# Patient Record
Sex: FEMALE | Race: WHITE | Employment: PART TIME | URBAN - METROPOLITAN AREA
[De-identification: names, ages, dates, MRNs, and addresses within clinical notes are randomized per-mention and may not be internally consistent; named-entity substitution may affect disease eponyms.]

---

## 2017-04-26 ENCOUNTER — OFFICE VISIT (OUTPATIENT)
Dept: FAMILY MEDICINE CLINIC | Age: 52
End: 2017-04-26

## 2017-04-26 VITALS
WEIGHT: 154.8 LBS | BODY MASS INDEX: 27.43 KG/M2 | HEART RATE: 70 BPM | DIASTOLIC BLOOD PRESSURE: 84 MMHG | SYSTOLIC BLOOD PRESSURE: 136 MMHG | HEIGHT: 63 IN | RESPIRATION RATE: 16 BRPM | OXYGEN SATURATION: 98 %

## 2017-04-26 DIAGNOSIS — Z12.39 SCREENING FOR BREAST CANCER: ICD-10-CM

## 2017-04-26 DIAGNOSIS — F98.8 ADD (ATTENTION DEFICIT DISORDER): ICD-10-CM

## 2017-04-26 DIAGNOSIS — S93.431A SPRAIN OF TIBIOFIBULAR LIGAMENT OF RIGHT ANKLE, INITIAL ENCOUNTER: ICD-10-CM

## 2017-04-26 DIAGNOSIS — Z23 NEED FOR TDAP VACCINATION: ICD-10-CM

## 2017-04-26 DIAGNOSIS — Z12.11 SCREENING FOR COLON CANCER: Primary | ICD-10-CM

## 2017-04-26 PROCEDURE — 99213 OFFICE O/P EST LOW 20 MIN: CPT | Performed by: FAMILY MEDICINE

## 2017-04-26 ASSESSMENT — PATIENT HEALTH QUESTIONNAIRE - PHQ9
1. LITTLE INTEREST OR PLEASURE IN DOING THINGS: 0
SUM OF ALL RESPONSES TO PHQ QUESTIONS 1-9: 0
2. FEELING DOWN, DEPRESSED OR HOPELESS: 0
SUM OF ALL RESPONSES TO PHQ9 QUESTIONS 1 & 2: 0

## 2017-05-23 DIAGNOSIS — F98.8 ADD (ATTENTION DEFICIT DISORDER): ICD-10-CM

## 2017-06-26 DIAGNOSIS — F98.8 ADD (ATTENTION DEFICIT DISORDER): ICD-10-CM

## 2017-07-24 DIAGNOSIS — F98.8 ADD (ATTENTION DEFICIT DISORDER): ICD-10-CM

## 2017-08-22 DIAGNOSIS — F98.8 ADD (ATTENTION DEFICIT DISORDER): ICD-10-CM

## 2017-09-26 DIAGNOSIS — F98.8 ADD (ATTENTION DEFICIT DISORDER): ICD-10-CM

## 2017-10-24 DIAGNOSIS — F98.8 ATTENTION DEFICIT DISORDER, UNSPECIFIED HYPERACTIVITY PRESENCE: ICD-10-CM

## 2017-10-24 NOTE — TELEPHONE ENCOUNTER
Medication and Quantity requested: lisdexamfetamine (VYVANSE) 30 MG capsule/ 30        Last Visit  04/26/2017    Pharmacy and phone number updated in EPIC:  yes

## 2017-10-28 DIAGNOSIS — F98.8 ATTENTION DEFICIT DISORDER, UNSPECIFIED HYPERACTIVITY PRESENCE: ICD-10-CM

## 2017-11-21 DIAGNOSIS — F98.8 ATTENTION DEFICIT DISORDER, UNSPECIFIED HYPERACTIVITY PRESENCE: ICD-10-CM

## 2017-11-22 NOTE — TELEPHONE ENCOUNTER
I contacted the office to see if she had received this message. I left a message asking her to call the office.

## 2017-11-27 DIAGNOSIS — F98.8 ATTENTION DEFICIT DISORDER, UNSPECIFIED HYPERACTIVITY PRESENCE: ICD-10-CM

## 2017-11-27 NOTE — TELEPHONE ENCOUNTER
Medication and Quantity requested: lisdexamfetamine (VYVANSE) 30 MG capsule     Last Visit  4/26/17    Pharmacy and phone number updated in EPIC:  yes

## 2017-11-29 ENCOUNTER — OFFICE VISIT (OUTPATIENT)
Dept: FAMILY MEDICINE CLINIC | Age: 52
End: 2017-11-29

## 2017-11-29 VITALS
OXYGEN SATURATION: 98 % | RESPIRATION RATE: 15 BRPM | TEMPERATURE: 97.5 F | WEIGHT: 147 LBS | BODY MASS INDEX: 26.04 KG/M2 | DIASTOLIC BLOOD PRESSURE: 78 MMHG | SYSTOLIC BLOOD PRESSURE: 112 MMHG | HEART RATE: 70 BPM

## 2017-11-29 DIAGNOSIS — J06.9 UPPER RESPIRATORY TRACT INFECTION, UNSPECIFIED TYPE: Primary | ICD-10-CM

## 2017-11-29 PROCEDURE — 99214 OFFICE O/P EST MOD 30 MIN: CPT | Performed by: FAMILY MEDICINE

## 2017-11-29 RX ORDER — AZITHROMYCIN 250 MG/1
TABLET, FILM COATED ORAL
Qty: 6 TABLET | Refills: 0 | Status: SHIPPED | OUTPATIENT
Start: 2017-11-29 | End: 2018-07-31

## 2017-11-29 NOTE — LETTER
[address] [address]  Phone: [phone]  Fax: [phone]    Jim Willis MD          November 29, 2017           Patient: Amira Velez   MR Number: [id number]   YOB: 1965   Date of Visit: 11/29/2017       Please excuse the above patient from work from November 27-29 due to illness/doctor's appointment. Please call my office if you have any questions.     Sincerely,                           Jim Willis MD

## 2017-11-29 NOTE — PATIENT INSTRUCTIONS
acetaminophen (Tylenol) can be harmful. · Get plenty of rest.  · Do not smoke or allow others to smoke around you. If you need help quitting, talk to your doctor about stop-smoking programs and medicines. These can increase your chances of quitting for good. When should you call for help? Call 911 anytime you think you may need emergency care. For example, call if:  · You have severe trouble breathing. Call your doctor now or seek immediate medical care if:  · You seem to be getting much sicker. · You have new or worse trouble breathing. · You have a new or higher fever. · You have a new rash. Watch closely for changes in your health, and be sure to contact your doctor if:  · You have a new symptom, such as a sore throat, an earache, or sinus pain. · You cough more deeply or more often, especially if you notice more mucus or a change in the color of your mucus. · You do not get better as expected. Where can you learn more? Go to https://Nortis.VEASYT. org and sign in to your Echometrix account. Enter B411 in the Fluorofinder box to learn more about \"Upper Respiratory Infection (Cold): Care Instructions. \"     If you do not have an account, please click on the \"Sign Up Now\" link. Current as of: March 25, 2017  Content Version: 11.3  © 4064-5468 Shareight, Incorporated. Care instructions adapted under license by [facility] ([facility]). If you have questions about a medical condition or this instruction, always ask your healthcare professional. [place] [address] any warranty or liability for your use of this information.

## 2017-11-30 ENCOUNTER — TELEPHONE (OUTPATIENT)
Dept: FAMILY MEDICINE CLINIC | Age: 52
End: 2017-11-30

## 2017-12-26 DIAGNOSIS — F98.8 ATTENTION DEFICIT DISORDER, UNSPECIFIED HYPERACTIVITY PRESENCE: ICD-10-CM

## 2017-12-26 NOTE — TELEPHONE ENCOUNTER
Medication and Quantity requested: lisdexamfetamine (VYVANSE) 30 MG capsule  Qty 30     Last Visit  11/29/17    Pharmacy and phone number updated in [facility]:  yes

## 2018-01-25 DIAGNOSIS — F98.8 ATTENTION DEFICIT DISORDER, UNSPECIFIED HYPERACTIVITY PRESENCE: ICD-10-CM

## 2018-01-25 NOTE — TELEPHONE ENCOUNTER
Medication and Quantity requested: lisdexamfetamine (VYVANSE) 30 MG capsule        Last Visit  11/29/2017    Pharmacy and phone number updated in EPIC:  yes

## 2018-02-07 ENCOUNTER — OFFICE VISIT (OUTPATIENT)
Dept: FAMILY MEDICINE CLINIC | Age: 53
End: 2018-02-07

## 2018-02-07 VITALS
DIASTOLIC BLOOD PRESSURE: 64 MMHG | SYSTOLIC BLOOD PRESSURE: 110 MMHG | HEIGHT: 63 IN | BODY MASS INDEX: 26.05 KG/M2 | WEIGHT: 147 LBS

## 2018-02-07 DIAGNOSIS — F51.02 ADJUSTMENT INSOMNIA: ICD-10-CM

## 2018-02-07 DIAGNOSIS — L98.9 SKIN ABNORMALITY: ICD-10-CM

## 2018-02-07 DIAGNOSIS — F43.21 GRIEF REACTION: Primary | ICD-10-CM

## 2018-02-07 PROCEDURE — G8419 CALC BMI OUT NRM PARAM NOF/U: HCPCS | Performed by: FAMILY MEDICINE

## 2018-02-07 PROCEDURE — 1036F TOBACCO NON-USER: CPT | Performed by: FAMILY MEDICINE

## 2018-02-07 PROCEDURE — 3014F SCREEN MAMMO DOC REV: CPT | Performed by: FAMILY MEDICINE

## 2018-02-07 PROCEDURE — G8484 FLU IMMUNIZE NO ADMIN: HCPCS | Performed by: FAMILY MEDICINE

## 2018-02-07 PROCEDURE — 99213 OFFICE O/P EST LOW 20 MIN: CPT | Performed by: FAMILY MEDICINE

## 2018-02-07 PROCEDURE — 3017F COLORECTAL CA SCREEN DOC REV: CPT | Performed by: FAMILY MEDICINE

## 2018-02-07 PROCEDURE — G8427 DOCREV CUR MEDS BY ELIG CLIN: HCPCS | Performed by: FAMILY MEDICINE

## 2018-02-07 RX ORDER — LORAZEPAM 0.5 MG/1
0.5 TABLET ORAL NIGHTLY
Qty: 15 TABLET | Refills: 0 | Status: SHIPPED | OUTPATIENT
Start: 2018-02-07 | End: 2018-02-22

## 2018-02-07 NOTE — PROGRESS NOTES
Feliz Edmond is a 46 y.o. female. HPI:  Father  2 days ago, she is sad and tearful  Having a hard time going to work- started a new job  Having hard time sleeping  Also concerned about area on the left side of her nose near the tip which is enlarging  Needs  to get her mammogram    Wt Readings from Last 3 Encounters:   18 147 lb (66.7 kg)   17 147 lb (66.7 kg)   17 154 lb 12.8 oz (70.2 kg)     Meds, vitamins and allergies reviewed with Patient    ROS:  Gen:  No fever  HEENT:  No cold symptoms, sore throat. CV:  Denies chest pain or palpitations. Pulm:  Denies shortness of breath, cough. Abd:  Denies abdominal pain, nausea and vomiting. Skin: no rash    Allergies   Allergen Reactions    Pcn [Penicillins] Other (See Comments)       Prior to Visit Medications    Medication Sig Taking? Authorizing Provider   LORazepam (ATIVAN) 0.5 MG tablet Take 1 tablet by mouth nightly for 15 days. Yes Meera Fleming MD   lisdexamfetamine (VYVANSE) 30 MG capsule Take 1 capsule by mouth every morning for 30 days. Yes Jayjay Balderas MD   azithromycin (ZITHROMAX Z-MARTHA) 250 MG tablet Take 2 tablets on day one and 1 tablet daily on days 2-5  Craig Glass MD       OBJECTIVE:  /64 (Site: Right Arm, Position: Sitting, Cuff Size: Small Adult)   Ht 5' 3\" (1.6 m)   Wt 147 lb (66.7 kg)   LMP 2016 (Approximate)   BMI 26.04 kg/m²   GEN:  Tearful  HEENT:  NCAT, TMs:normal and throat: clear   Left side of her nose is slightly swollen  NECK:  Supple without adenopathy. CV:  Regular rate and rhythm, S1 and S2 normal  PULM:  Chest is clear, no wheezing ,  symmetric air entry throughout both lung fields. ABD: Soft, NT  EXT: No rash or edema  NEURO: Alert and oriented ×3 nonfocal    ASSESSMENT/PLAN:  1. Skin abnormality  Refer  - Toshia Gonzalez MD    2. Grief reaction  Needs to take time off of work- rest of this week return Monday    3.  Adjustment insomnia  Ativan when necessary only  - LORazepam

## 2018-02-26 ENCOUNTER — TELEPHONE (OUTPATIENT)
Dept: FAMILY MEDICINE CLINIC | Age: 53
End: 2018-02-26

## 2018-02-26 ENCOUNTER — OFFICE VISIT (OUTPATIENT)
Dept: FAMILY MEDICINE CLINIC | Age: 53
End: 2018-02-26

## 2018-02-26 VITALS
OXYGEN SATURATION: 96 % | HEIGHT: 63 IN | DIASTOLIC BLOOD PRESSURE: 78 MMHG | BODY MASS INDEX: 27.29 KG/M2 | WEIGHT: 154 LBS | HEART RATE: 90 BPM | SYSTOLIC BLOOD PRESSURE: 130 MMHG

## 2018-02-26 DIAGNOSIS — F98.8 ATTENTION DEFICIT DISORDER, UNSPECIFIED HYPERACTIVITY PRESENCE: ICD-10-CM

## 2018-02-26 DIAGNOSIS — F98.8 ATTENTION DEFICIT DISORDER, UNSPECIFIED HYPERACTIVITY PRESENCE: Primary | ICD-10-CM

## 2018-02-26 DIAGNOSIS — J30.81 CAT ALLERGIES: ICD-10-CM

## 2018-02-26 DIAGNOSIS — Z76.0 ENCOUNTER FOR MEDICATION REFILL: ICD-10-CM

## 2018-02-26 DIAGNOSIS — F43.21 GRIEF REACTION: ICD-10-CM

## 2018-02-26 PROCEDURE — G8427 DOCREV CUR MEDS BY ELIG CLIN: HCPCS | Performed by: FAMILY MEDICINE

## 2018-02-26 PROCEDURE — G8419 CALC BMI OUT NRM PARAM NOF/U: HCPCS | Performed by: FAMILY MEDICINE

## 2018-02-26 PROCEDURE — 99213 OFFICE O/P EST LOW 20 MIN: CPT | Performed by: FAMILY MEDICINE

## 2018-02-26 PROCEDURE — 1036F TOBACCO NON-USER: CPT | Performed by: FAMILY MEDICINE

## 2018-02-26 PROCEDURE — 3014F SCREEN MAMMO DOC REV: CPT | Performed by: FAMILY MEDICINE

## 2018-02-26 PROCEDURE — 3017F COLORECTAL CA SCREEN DOC REV: CPT | Performed by: FAMILY MEDICINE

## 2018-02-26 PROCEDURE — G8484 FLU IMMUNIZE NO ADMIN: HCPCS | Performed by: FAMILY MEDICINE

## 2018-02-26 RX ORDER — LORAZEPAM 1 MG/1
1 TABLET ORAL NIGHTLY PRN
Qty: 30 TABLET | Refills: 0 | Status: SHIPPED | OUTPATIENT
Start: 2018-02-26 | End: 2018-03-28

## 2018-02-26 RX ORDER — LORATADINE 10 MG/1
10 TABLET ORAL DAILY
Qty: 90 TABLET | Refills: 1 | Status: SHIPPED | OUTPATIENT
Start: 2018-02-26 | End: 2019-06-12

## 2018-02-26 RX ORDER — FLUTICASONE PROPIONATE 50 MCG
2 SPRAY, SUSPENSION (ML) NASAL DAILY
Qty: 1 BOTTLE | Refills: 3 | Status: SHIPPED | OUTPATIENT
Start: 2018-02-26 | End: 2018-07-31 | Stop reason: SDUPTHER

## 2018-03-24 DIAGNOSIS — F98.8 ATTENTION DEFICIT DISORDER, UNSPECIFIED HYPERACTIVITY PRESENCE: ICD-10-CM

## 2018-03-24 NOTE — TELEPHONE ENCOUNTER
Medication and Quantity requested:  lisdexamfetamine   (vyvanse) qty 30    Last Visit 02/26/2018      Pharmacy and phone number updated in EPIC:  yes

## 2018-03-27 DIAGNOSIS — F98.8 ATTENTION DEFICIT DISORDER, UNSPECIFIED HYPERACTIVITY PRESENCE: ICD-10-CM

## 2018-04-25 DIAGNOSIS — F98.8 ATTENTION DEFICIT DISORDER, UNSPECIFIED HYPERACTIVITY PRESENCE: ICD-10-CM

## 2018-05-24 DIAGNOSIS — F98.8 ATTENTION DEFICIT DISORDER, UNSPECIFIED HYPERACTIVITY PRESENCE: ICD-10-CM

## 2018-06-25 DIAGNOSIS — F98.8 ATTENTION DEFICIT DISORDER, UNSPECIFIED HYPERACTIVITY PRESENCE: ICD-10-CM

## 2018-07-24 DIAGNOSIS — F98.8 ATTENTION DEFICIT DISORDER, UNSPECIFIED HYPERACTIVITY PRESENCE: ICD-10-CM

## 2018-07-31 ENCOUNTER — OFFICE VISIT (OUTPATIENT)
Dept: FAMILY MEDICINE CLINIC | Age: 53
End: 2018-07-31

## 2018-07-31 VITALS
HEART RATE: 90 BPM | WEIGHT: 143.6 LBS | HEIGHT: 63 IN | DIASTOLIC BLOOD PRESSURE: 78 MMHG | SYSTOLIC BLOOD PRESSURE: 110 MMHG | BODY MASS INDEX: 25.45 KG/M2 | TEMPERATURE: 98.6 F | OXYGEN SATURATION: 98 %

## 2018-07-31 DIAGNOSIS — M25.522 ELBOW PAIN, LEFT: ICD-10-CM

## 2018-07-31 DIAGNOSIS — R05.9 COUGH: Primary | ICD-10-CM

## 2018-07-31 DIAGNOSIS — M25.512 ACUTE PAIN OF LEFT SHOULDER: ICD-10-CM

## 2018-07-31 DIAGNOSIS — J30.81 CAT ALLERGIES: ICD-10-CM

## 2018-07-31 PROCEDURE — 1036F TOBACCO NON-USER: CPT | Performed by: FAMILY MEDICINE

## 2018-07-31 PROCEDURE — 99213 OFFICE O/P EST LOW 20 MIN: CPT | Performed by: FAMILY MEDICINE

## 2018-07-31 PROCEDURE — G8419 CALC BMI OUT NRM PARAM NOF/U: HCPCS | Performed by: FAMILY MEDICINE

## 2018-07-31 PROCEDURE — G8427 DOCREV CUR MEDS BY ELIG CLIN: HCPCS | Performed by: FAMILY MEDICINE

## 2018-07-31 PROCEDURE — 3017F COLORECTAL CA SCREEN DOC REV: CPT | Performed by: FAMILY MEDICINE

## 2018-07-31 RX ORDER — METHYLPREDNISOLONE 4 MG/1
TABLET ORAL
Qty: 1 KIT | Refills: 0 | Status: SHIPPED | OUTPATIENT
Start: 2018-07-31 | End: 2018-08-07 | Stop reason: SDUPTHER

## 2018-07-31 RX ORDER — FLUTICASONE PROPIONATE 50 MCG
2 SPRAY, SUSPENSION (ML) NASAL DAILY
Qty: 1 BOTTLE | Refills: 3 | Status: SHIPPED | OUTPATIENT
Start: 2018-07-31 | End: 2020-12-16

## 2018-07-31 ASSESSMENT — ENCOUNTER SYMPTOMS
GASTROINTESTINAL NEGATIVE: 1
COUGH: 1
WHEEZING: 0

## 2018-07-31 ASSESSMENT — PATIENT HEALTH QUESTIONNAIRE - PHQ9
2. FEELING DOWN, DEPRESSED OR HOPELESS: 0
1. LITTLE INTEREST OR PLEASURE IN DOING THINGS: 0
SUM OF ALL RESPONSES TO PHQ9 QUESTIONS 1 & 2: 0
SUM OF ALL RESPONSES TO PHQ QUESTIONS 1-9: 0

## 2018-07-31 NOTE — LETTER
[address] [address]  Phone: [phone]  Fax: [phone]    Abraham Mcmanus MD        July 31, 2018     Patient: Rosalba Charles   YOB: 1965   Date of Visit: 7/31/2018       To Whom it May Concern:    Rosalba Charles was seen in my clinic on 7/31/2018. She may return to work on 08/01/18      If you have any questions or concerns, please don't hesitate to call.     Sincerely,         Abraham Mcmanus MD

## 2018-07-31 NOTE — PATIENT INSTRUCTIONS
Patient Education        Cough: Care Instructions  Your Care Instructions    A cough is your body's response to something that bothers your throat or airways. Many things can cause a cough. You might cough because of a cold or the flu, bronchitis, or asthma. Smoking, postnasal drip, allergies, and stomach acid that backs up into your throat also can cause coughs. A cough is a symptom, not a disease. Most coughs stop when the cause, such as a cold, goes away. You can take a few steps at home to cough less and feel better. Follow-up care is a key part of your treatment and safety. Be sure to make and go to all appointments, and call your doctor if you are having problems. It's also a good idea to know your test results and keep a list of the medicines you take. How can you care for yourself at home? · Drink lots of water and other fluids. This helps thin the mucus and soothes a dry or sore throat. Honey or lemon juice in hot water or tea may ease a dry cough. · Take cough medicine as directed by your doctor. · Prop up your head on pillows to help you breathe and ease a dry cough. · Try cough drops to soothe a dry or sore throat. Cough drops don't stop a cough. Medicine-flavored cough drops are no better than candy-flavored drops or hard candy. · Do not smoke. Avoid secondhand smoke. If you need help quitting, talk to your doctor about stop-smoking programs and medicines. These can increase your chances of quitting for good. When should you call for help? Call 911 anytime you think you may need emergency care.  For example, call if:    · You have severe trouble breathing.    Call your doctor now or seek immediate medical care if:    · You cough up blood.     · You have new or worse trouble breathing.     · You have a new or higher fever.     · You have a new rash.    Watch closely for changes in your health, and be sure to contact your doctor if:    · You cough more deeply or more often, especially if you notice more mucus or a change in the color of your mucus.     · You have new symptoms, such as a sore throat, an earache, or sinus pain.     · You do not get better as expected. Where can you learn more? Go to https://chpeoanheweb.Offbeat Guides. org and sign in to your Hers account. Enter D279 in the Helixbind box to learn more about \"Cough: Care Instructions. \"     If you do not have an account, please click on the \"Sign Up Now\" link. Current as of: December 6, 2017  Content Version: 11.6  © 2774-4830 Tradeos, Incorporated. Care instructions adapted under license by [facility] ([facility]). If you have questions about a medical condition or this instruction, always ask your healthcare professional. [address] any warranty or liability for your use of this information.

## 2018-08-01 ENCOUNTER — HOSPITAL ENCOUNTER (OUTPATIENT)
Dept: OTHER | Age: 53
Discharge: HOME | End: 2018-08-01
Attending: FAMILY MEDICINE | Admitting: FAMILY MEDICINE

## 2018-08-01 ENCOUNTER — TELEPHONE (OUTPATIENT)
Dept: FAMILY MEDICINE CLINIC | Age: 53
End: 2018-08-01

## 2018-08-01 DIAGNOSIS — R05.9 COUGH: ICD-10-CM

## 2018-08-02 NOTE — TELEPHONE ENCOUNTER
Not sure if the original letter was faxed but I printed a new one because the dates were wrong. Letter on counter for you to sign.

## 2018-08-02 NOTE — TELEPHONE ENCOUNTER
What is the status of the letter. Take care of it if not already done and fax it to her work. Let her know. Chest x-ray is in result notes or her my chart if it is active. Chest x-ray was negative. It may take a few more days to feel better.

## 2018-08-03 ENCOUNTER — TELEPHONE (OUTPATIENT)
Dept: FAMILY MEDICINE CLINIC | Age: 53
End: 2018-08-03

## 2018-08-03 NOTE — TELEPHONE ENCOUNTER
Pt states she is still not feeling well and is requesting a new letter be sent to her employer stating she can return to work Monday, August 6th. Pt states she is sick to her stomach, has diarrhea, headache, and cough.     Fax: [phone]

## 2018-08-03 NOTE — TELEPHONE ENCOUNTER
Pt would like to know if the first work letter has been already sent to her employer. Pt says that she's feel a bit better but will feel better is she returns back to work on Monday. Please create second work letter with return back to work on Monday and fax.

## 2018-08-07 ENCOUNTER — TELEPHONE (OUTPATIENT)
Dept: FAMILY MEDICINE CLINIC | Age: 53
End: 2018-08-07

## 2018-08-07 ENCOUNTER — OFFICE VISIT (OUTPATIENT)
Dept: FAMILY MEDICINE CLINIC | Age: 53
End: 2018-08-07

## 2018-08-07 VITALS
HEART RATE: 91 BPM | OXYGEN SATURATION: 99 % | BODY MASS INDEX: 26.29 KG/M2 | DIASTOLIC BLOOD PRESSURE: 80 MMHG | HEIGHT: 63 IN | WEIGHT: 148.4 LBS | TEMPERATURE: 97.7 F | SYSTOLIC BLOOD PRESSURE: 114 MMHG

## 2018-08-07 DIAGNOSIS — M25.522 ELBOW PAIN, LEFT: ICD-10-CM

## 2018-08-07 DIAGNOSIS — R05.9 COUGH: ICD-10-CM

## 2018-08-07 DIAGNOSIS — M25.512 ACUTE PAIN OF LEFT SHOULDER: ICD-10-CM

## 2018-08-07 DIAGNOSIS — Z00.00 WELL ADULT EXAM: ICD-10-CM

## 2018-08-07 DIAGNOSIS — R05.9 COUGH: Primary | ICD-10-CM

## 2018-08-07 PROCEDURE — G8427 DOCREV CUR MEDS BY ELIG CLIN: HCPCS | Performed by: FAMILY MEDICINE

## 2018-08-07 PROCEDURE — 1036F TOBACCO NON-USER: CPT | Performed by: FAMILY MEDICINE

## 2018-08-07 PROCEDURE — 3017F COLORECTAL CA SCREEN DOC REV: CPT | Performed by: FAMILY MEDICINE

## 2018-08-07 PROCEDURE — 99213 OFFICE O/P EST LOW 20 MIN: CPT | Performed by: FAMILY MEDICINE

## 2018-08-07 PROCEDURE — G8419 CALC BMI OUT NRM PARAM NOF/U: HCPCS | Performed by: FAMILY MEDICINE

## 2018-08-07 RX ORDER — AZITHROMYCIN 250 MG/1
TABLET, FILM COATED ORAL
Qty: 6 TABLET | Refills: 0 | Status: SHIPPED | OUTPATIENT
Start: 2018-08-07 | End: 2018-08-17

## 2018-08-07 ASSESSMENT — PATIENT HEALTH QUESTIONNAIRE - PHQ9
1. LITTLE INTEREST OR PLEASURE IN DOING THINGS: 0
SUM OF ALL RESPONSES TO PHQ QUESTIONS 1-9: 0
SUM OF ALL RESPONSES TO PHQ QUESTIONS 1-9: 0
SUM OF ALL RESPONSES TO PHQ9 QUESTIONS 1 & 2: 0
2. FEELING DOWN, DEPRESSED OR HOPELESS: 0

## 2018-08-07 ASSESSMENT — ENCOUNTER SYMPTOMS
SHORTNESS OF BREATH: 1
GASTROINTESTINAL NEGATIVE: 1
COUGH: 1

## 2018-08-07 NOTE — PATIENT INSTRUCTIONS
Patient Education        Cough: Care Instructions  Your Care Instructions    A cough is your body's response to something that bothers your throat or airways. Many things can cause a cough. You might cough because of a cold or the flu, bronchitis, or asthma. Smoking, postnasal drip, allergies, and stomach acid that backs up into your throat also can cause coughs. A cough is a symptom, not a disease. Most coughs stop when the cause, such as a cold, goes away. You can take a few steps at home to cough less and feel better. Follow-up care is a key part of your treatment and safety. Be sure to make and go to all appointments, and call your doctor if you are having problems. It's also a good idea to know your test results and keep a list of the medicines you take. How can you care for yourself at home? · Drink lots of water and other fluids. This helps thin the mucus and soothes a dry or sore throat. Honey or lemon juice in hot water or tea may ease a dry cough. · Take cough medicine as directed by your doctor. · Prop up your head on pillows to help you breathe and ease a dry cough. · Try cough drops to soothe a dry or sore throat. Cough drops don't stop a cough. Medicine-flavored cough drops are no better than candy-flavored drops or hard candy. · Do not smoke. Avoid secondhand smoke. If you need help quitting, talk to your doctor about stop-smoking programs and medicines. These can increase your chances of quitting for good. When should you call for help? Call 911 anytime you think you may need emergency care.  For example, call if:    · You have severe trouble breathing.    Call your doctor now or seek immediate medical care if:    · You cough up blood.     · You have new or worse trouble breathing.     · You have a new or higher fever.     · You have a new rash.    Watch closely for changes in your health, and be sure to contact your doctor if:    · You cough more deeply or more often, especially if you

## 2018-08-09 ENCOUNTER — TELEPHONE (OUTPATIENT)
Dept: FAMILY MEDICINE CLINIC | Age: 53
End: 2018-08-09

## 2018-08-09 RX ORDER — METHYLPREDNISOLONE 4 MG/1
TABLET ORAL
Qty: 21 TABLET | Refills: 0 | Status: SHIPPED | OUTPATIENT
Start: 2018-08-09 | End: 2019-01-28 | Stop reason: ALTCHOICE

## 2018-08-09 NOTE — TELEPHONE ENCOUNTER
FYI  Patient  States that the second dose of antibiotics seems to be helping. She states she feels better. Not all the way back to normal but feels better. She also wanted us to know that  She still hasn't received her packet to return to work.  Please advise

## 2018-08-10 ENCOUNTER — TELEPHONE (OUTPATIENT)
Dept: FAMILY MEDICINE CLINIC | Age: 53
End: 2018-08-10

## 2018-08-13 NOTE — TELEPHONE ENCOUNTER
Pt asked if paper work could be refaxed, employer stating forms not received. I advised I will refax to # on forms.

## 2018-08-15 NOTE — PROGRESS NOTES
Subjective:      Patient ID: Payam Hope is a 46 y.o. female. HPI  Feels a little better but still not 100%. Feels foggy and headache. Still coughing. Some sputum that is yellow. Didn't check her temperature but has been cold and sweating. MDP helped elbow but not so much cough and chest X-ray was negative. Review of Systems   Constitutional: Negative for fever. Respiratory: Positive for cough and shortness of breath. Cardiovascular: Negative. Gastrointestinal: Negative. Genitourinary: Negative. Neurological: Positive for headaches (off and on and feels foggy headed). Objective:   Physical Exam   Constitutional: She is oriented to person, place, and time. No distress. Cardiovascular: Normal rate and regular rhythm. Pulmonary/Chest: Effort normal and breath sounds normal. She has no wheezes. She has no rales. Neurological: She is alert and oriented to person, place, and time. Skin: Skin is warm and dry. Psychiatric: She has a normal mood and affect. Her behavior is normal. Judgment and thought content normal.       Assessment/Plan:    Jeb Marie was seen today for headache and congestion. Diagnoses and all orders for this visit:    Cough  -     CBC Auto Differential; Future  -     azithromycin (ZITHROMAX Z-MARTHA) 250 MG tablet; Take 2 tablets daily on day 1, then 1 tablet daily on days 2-5.   The patient states she needs an FMLA filled out and will bring it in  Return if not better      Complete blood/hep C/HIV/hemoglobin A1c This writer attempted to contact patient on 08/15/18      Reason for call MRI and left message.      If patient calls back:   [facility] 1st floor care team        Ada Hairston CMA

## 2018-08-16 ENCOUNTER — TELEPHONE (OUTPATIENT)
Dept: FAMILY MEDICINE CLINIC | Age: 53
End: 2018-08-16

## 2018-08-25 DIAGNOSIS — F98.8 ATTENTION DEFICIT DISORDER, UNSPECIFIED HYPERACTIVITY PRESENCE: ICD-10-CM

## 2018-08-25 NOTE — TELEPHONE ENCOUNTER
Medication and Quantity requested: Vyvamse 30mg     Last Visit  8-7-18,    Pharmacy and phone number updated in [facility]:  Yes,Kaia

## 2018-09-19 ENCOUNTER — OFFICE VISIT (OUTPATIENT)
Dept: FAMILY MEDICINE CLINIC | Age: 53
End: 2018-09-19

## 2018-09-19 VITALS
BODY MASS INDEX: 25.52 KG/M2 | HEIGHT: 63 IN | HEART RATE: 73 BPM | TEMPERATURE: 97.7 F | WEIGHT: 144 LBS | SYSTOLIC BLOOD PRESSURE: 132 MMHG | DIASTOLIC BLOOD PRESSURE: 80 MMHG | OXYGEN SATURATION: 98 %

## 2018-09-19 DIAGNOSIS — J06.9 PROTRACTED URI: Primary | ICD-10-CM

## 2018-09-19 DIAGNOSIS — R09.82 POSTNASAL DRIP: ICD-10-CM

## 2018-09-19 PROCEDURE — 3017F COLORECTAL CA SCREEN DOC REV: CPT | Performed by: FAMILY MEDICINE

## 2018-09-19 PROCEDURE — 1036F TOBACCO NON-USER: CPT | Performed by: FAMILY MEDICINE

## 2018-09-19 PROCEDURE — G8419 CALC BMI OUT NRM PARAM NOF/U: HCPCS | Performed by: FAMILY MEDICINE

## 2018-09-19 PROCEDURE — G8427 DOCREV CUR MEDS BY ELIG CLIN: HCPCS | Performed by: FAMILY MEDICINE

## 2018-09-19 PROCEDURE — 99213 OFFICE O/P EST LOW 20 MIN: CPT | Performed by: FAMILY MEDICINE

## 2018-09-19 RX ORDER — CEFUROXIME AXETIL 250 MG/1
250 TABLET ORAL 2 TIMES DAILY
Qty: 20 TABLET | Refills: 0 | Status: SHIPPED | OUTPATIENT
Start: 2018-09-19 | End: 2018-09-29

## 2018-09-19 RX ORDER — FEXOFENADINE HCL 180 MG/1
180 TABLET ORAL DAILY
Qty: 30 TABLET | Refills: 1 | Status: SHIPPED | OUTPATIENT
Start: 2018-09-19 | End: 2019-06-12

## 2018-09-19 NOTE — PROGRESS NOTES
Comment: occ       Family History   Problem Relation Age of Onset    Diabetes Father        OBJECTIVE:  /80   Pulse 73   Temp 97.7 °F (36.5 °C)   Ht 5' 3\" (1.6 m)   Wt 144 lb (65.3 kg)   LMP 02/04/2016 (Approximate)   SpO2 98%   BMI 25.51 kg/m²   GEN:  Sinus pressure and postnasal drip, or phobic because she does not feel well  HEENT:  NCAT, TMs:normal and throat: Is that of thick postnasal drip seen  NECK:  Supple without adenopathy. CV:  Regular rate and rhythm, S1 and S2 normal, no murmurs, clicks  PULM:  Chest is clear, no wheezing ,  symmetric air entry throughout both lung fields. ABD: Soft, NT  EXT: No rash or edema  NEURO: Alert oriented ×3, no assistive device    ASSESSMENT/PLAN:  1. Protracted URI  - cefUROXime (CEFTIN) 250 MG tablet; Take 1 tablet by mouth 2 times daily for 10 days  Dispense: 20 tablet; Refill: 0  Take antibiotic with food/water and probiotic    2. Postnasal drip  Flonase plus Allegra  - fexofenadine (ALLEGRA) 180 MG tablet; Take 1 tablet by mouth daily  Dispense: 30 tablet;  Refill: 1    Alternate tylenol with advil  Every 6-8 hrs as needed for fever, pain  Follow up if sxs persist or worsen and for routine care with PCP  Flu Vaccine when feeling better

## 2018-09-19 NOTE — PATIENT INSTRUCTIONS
hot, wet towel or a warm gel pack on your face 3 or 4 times a day for 5 to 10 minutes each time. · Try a decongestant nasal spray like oxymetazoline (Afrin). Do not use it for more than 3 days in a row. Using it for more than 3 days can make your congestion worse. When should you call for help? Call your doctor now or seek immediate medical care if:    · You have new or worse swelling or redness in your face or around your eyes.     · You have a new or higher fever.    Watch closely for changes in your health, and be sure to contact your doctor if:    · You have new or worse facial pain.     · The mucus from your nose becomes thicker (like pus) or has new blood in it.     · You are not getting better as expected. Where can you learn more? Go to https://[email or web address].Blaze Medical Devices. org and sign in to your Ciafo account. Enter N158 in the i-marker box to learn more about \"Sinusitis: Care Instructions. \"     If you do not have an account, please click on the \"Sign Up Now\" link. Current as of: May 12, 2017  Content Version: 11.7  © 5327-6633 Genesis [employer]. Care instructions adapted under license by [facility] ([facility]). If you have questions about a medical condition or this instruction, always ask your healthcare professional. [place] [address] any warranty or liability for your use of this information. Patient Education        Saline Nasal Washes: Care Instructions  Your Care Instructions  Saline nasal washes help keep the nasal passages open by washing out thick or dried mucus. This simple remedy can help relieve symptoms of allergies, sinusitis, and colds. It also can make the nose feel more comfortable by keeping the mucous membranes moist. You may notice a little burning sensation in your nose the first few times you use the solution, but this usually gets better in a few days. Follow-up care is a key part of your treatment and safety.  Be sure to make and go to all appointments, and call your doctor if you are having problems. It's also a good idea to know your test results and keep a list of the medicines you take. How can you care for yourself at home? · You can buy premixed saline solution in a squeeze bottle or other sinus rinse products at a drugstore. Read and follow the instructions on the label. · You also can make your own saline solution by adding 1 teaspoon of salt and 1 teaspoon of baking soda to 2 cups of distilled water. · If you use a homemade solution, pour a small amount into a clean bowl. Using a rubber bulb syringe, squeeze the syringe and place the tip in the salt water. Pull a small amount of the salt water into the syringe by relaxing your hand. · Sit down with your head tilted slightly back. Do not lie down. Put the tip of the bulb syringe or the squeeze bottle a little way into one of your nostrils. Gently drip or squirt a few drops into the nostril. Repeat with the other nostril. Some sneezing and gagging are normal at first.  · Gently blow your nose. · Wipe the syringe or bottle tip clean after each use. · Repeat this 2 or 3 times a day. · Use nasal washes gently if you have nosebleeds often. When should you call for help? Watch closely for changes in your health, and be sure to contact your doctor if:    · You often get nosebleeds.     · You have problems doing the nasal washes. Where can you learn more? Go to https://Seva SearchpePower Vision.Gowalla. org and sign in to your Black Sand Technologies account. Enter [id number] [id number] [phone] in the [facility] box to learn more about \"Saline Nasal Washes: Care Instructions. \"     If you do not have an account, please click on the \"Sign Up Now\" link. Current as of: May 12, 2017  Content Version: 11.7  © 3783-6486 Kinnser Software, Incorporated. Care instructions adapted under license by [facility] ([facility]).  If you have questions about a medical condition or this instruction, always ask your healthcare professional. Claudeen Standard,

## 2018-09-19 NOTE — LETTER
[address] [address]  Phone: [phone]  Fax: [phone]    Pily Hoover MD        September 19, 2018     Patient: Ale Egan   YOB: 1965   Date of Visit: 9/19/2018       To Whom it May Concern:    Ale Egan was seen in my clinic on 9/19/2018. She may return to work on 9/20/18. If you have any questions or concerns, please don't hesitate to call.     Sincerely,         Pily Hoover MD

## 2018-09-26 DIAGNOSIS — F98.8 ATTENTION DEFICIT DISORDER, UNSPECIFIED HYPERACTIVITY PRESENCE: ICD-10-CM

## 2018-09-26 NOTE — TELEPHONE ENCOUNTER
Patient called in asking if Dr. Jocelyn Grove could approve this for since Dr. Arturo Chino is out of the office

## 2018-09-26 NOTE — TELEPHONE ENCOUNTER
Medication and Quantity requested: Vyvanse 30 MG     Last Visit  9/19/18    Pharmacy and phone number updated in EPIC:  yes

## 2018-10-16 ENCOUNTER — OFFICE VISIT (OUTPATIENT)
Dept: FAMILY MEDICINE CLINIC | Age: 53
End: 2018-10-16
Payer: COMMERCIAL

## 2018-10-16 VITALS
DIASTOLIC BLOOD PRESSURE: 78 MMHG | HEART RATE: 84 BPM | BODY MASS INDEX: 25.76 KG/M2 | WEIGHT: 145.4 LBS | SYSTOLIC BLOOD PRESSURE: 138 MMHG | OXYGEN SATURATION: 98 %

## 2018-10-16 DIAGNOSIS — F98.8 ATTENTION DEFICIT DISORDER (ADD) WITHOUT HYPERACTIVITY: Primary | ICD-10-CM

## 2018-10-16 DIAGNOSIS — S93.401A SPRAIN OF RIGHT ANKLE, UNSPECIFIED LIGAMENT, INITIAL ENCOUNTER: ICD-10-CM

## 2018-10-16 PROCEDURE — 99213 OFFICE O/P EST LOW 20 MIN: CPT | Performed by: FAMILY MEDICINE

## 2018-10-16 ASSESSMENT — PATIENT HEALTH QUESTIONNAIRE - PHQ9
SUM OF ALL RESPONSES TO PHQ QUESTIONS 1-9: 0
SUM OF ALL RESPONSES TO PHQ QUESTIONS 1-9: 0
SUM OF ALL RESPONSES TO PHQ9 QUESTIONS 1 & 2: 0
1. LITTLE INTEREST OR PLEASURE IN DOING THINGS: 0
2. FEELING DOWN, DEPRESSED OR HOPELESS: 0

## 2018-10-16 ASSESSMENT — ENCOUNTER SYMPTOMS
GASTROINTESTINAL NEGATIVE: 1
RESPIRATORY NEGATIVE: 1

## 2018-10-16 NOTE — LETTER
[address] [address]  Phone: [phone]  Fax: [phone]    Micah Britton MD        October 16, 2018     Patient: Piyush James   YOB: 1965   Date of Visit: 10/16/2018       To Whom it May Concern:    Piyush James was seen in my clinic on 10/16/2018. She may return to work on 10/16/18. She missed part of yesterday. If you have any questions or concerns, please don't hesitate to call.     Sincerely,         Micah Britton MD

## 2018-10-19 ENCOUNTER — TELEPHONE (OUTPATIENT)
Dept: FAMILY MEDICINE CLINIC | Age: 53
End: 2018-10-19

## 2018-10-25 DIAGNOSIS — F98.8 ATTENTION DEFICIT DISORDER, UNSPECIFIED HYPERACTIVITY PRESENCE: ICD-10-CM

## 2018-10-29 ENCOUNTER — OFFICE VISIT (OUTPATIENT)
Dept: FAMILY MEDICINE CLINIC | Age: 53
End: 2018-10-29
Payer: COMMERCIAL

## 2018-10-29 VITALS
OXYGEN SATURATION: 98 % | DIASTOLIC BLOOD PRESSURE: 50 MMHG | SYSTOLIC BLOOD PRESSURE: 100 MMHG | WEIGHT: 141.6 LBS | BODY MASS INDEX: 25.08 KG/M2 | HEART RATE: 97 BPM

## 2018-10-29 DIAGNOSIS — R05.9 COUGH: Primary | ICD-10-CM

## 2018-10-29 PROCEDURE — 99213 OFFICE O/P EST LOW 20 MIN: CPT | Performed by: FAMILY MEDICINE

## 2018-10-29 PROCEDURE — G8484 FLU IMMUNIZE NO ADMIN: HCPCS | Performed by: FAMILY MEDICINE

## 2018-10-29 PROCEDURE — G8427 DOCREV CUR MEDS BY ELIG CLIN: HCPCS | Performed by: FAMILY MEDICINE

## 2018-10-29 PROCEDURE — G8419 CALC BMI OUT NRM PARAM NOF/U: HCPCS | Performed by: FAMILY MEDICINE

## 2018-10-29 PROCEDURE — 3017F COLORECTAL CA SCREEN DOC REV: CPT | Performed by: FAMILY MEDICINE

## 2018-10-29 PROCEDURE — 1036F TOBACCO NON-USER: CPT | Performed by: FAMILY MEDICINE

## 2018-10-29 RX ORDER — DOXYCYCLINE HYCLATE 100 MG
100 TABLET ORAL 2 TIMES DAILY
Qty: 20 TABLET | Refills: 0 | Status: SHIPPED | OUTPATIENT
Start: 2018-10-29 | End: 2018-11-08

## 2018-10-29 RX ORDER — PROMETHAZINE HYDROCHLORIDE AND CODEINE PHOSPHATE 6.25; 1 MG/5ML; MG/5ML
5 SYRUP ORAL 4 TIMES DAILY PRN
Qty: 120 ML | Refills: 0 | Status: SHIPPED | OUTPATIENT
Start: 2018-10-29 | End: 2018-11-13 | Stop reason: SDUPTHER

## 2018-10-29 ASSESSMENT — PATIENT HEALTH QUESTIONNAIRE - PHQ9
SUM OF ALL RESPONSES TO PHQ9 QUESTIONS 1 & 2: 0
1. LITTLE INTEREST OR PLEASURE IN DOING THINGS: 0
SUM OF ALL RESPONSES TO PHQ QUESTIONS 1-9: 0
2. FEELING DOWN, DEPRESSED OR HOPELESS: 0
SUM OF ALL RESPONSES TO PHQ QUESTIONS 1-9: 0

## 2018-10-29 ASSESSMENT — ENCOUNTER SYMPTOMS
RHINORRHEA: 0
GASTROINTESTINAL NEGATIVE: 1
SINUS PAIN: 0
SHORTNESS OF BREATH: 0
SINUS PRESSURE: 0
SORE THROAT: 0
WHEEZING: 0
COUGH: 1

## 2018-10-30 ENCOUNTER — TELEPHONE (OUTPATIENT)
Dept: FAMILY MEDICINE CLINIC | Age: 53
End: 2018-10-30

## 2018-11-13 DIAGNOSIS — R05.9 COUGH: ICD-10-CM

## 2018-11-13 RX ORDER — PROMETHAZINE HYDROCHLORIDE AND CODEINE PHOSPHATE 6.25; 1 MG/5ML; MG/5ML
5 SYRUP ORAL 4 TIMES DAILY PRN
Qty: 120 ML | Refills: 0 | Status: SHIPPED | OUTPATIENT
Start: 2018-11-13 | End: 2018-11-27 | Stop reason: SDUPTHER

## 2018-11-23 DIAGNOSIS — F98.8 ATTENTION DEFICIT DISORDER, UNSPECIFIED HYPERACTIVITY PRESENCE: ICD-10-CM

## 2018-11-27 ENCOUNTER — TELEPHONE (OUTPATIENT)
Dept: FAMILY MEDICINE CLINIC | Age: 53
End: 2018-11-27

## 2018-11-27 DIAGNOSIS — R05.9 COUGH: ICD-10-CM

## 2018-11-27 RX ORDER — PROMETHAZINE HYDROCHLORIDE AND CODEINE PHOSPHATE 6.25; 1 MG/5ML; MG/5ML
5 SYRUP ORAL 4 TIMES DAILY PRN
Qty: 120 ML | Refills: 0 | Status: SHIPPED | OUTPATIENT
Start: 2018-11-27 | End: 2018-12-04

## 2018-12-17 ENCOUNTER — TELEPHONE (OUTPATIENT)
Dept: FAMILY MEDICINE CLINIC | Age: 53
End: 2018-12-17

## 2018-12-17 DIAGNOSIS — R05.9 COUGH: ICD-10-CM

## 2018-12-17 RX ORDER — PROMETHAZINE HYDROCHLORIDE AND CODEINE PHOSPHATE 6.25; 1 MG/5ML; MG/5ML
5 SYRUP ORAL 4 TIMES DAILY PRN
Qty: 120 ML | Refills: 0 | Status: SHIPPED | OUTPATIENT
Start: 2018-12-17 | End: 2019-01-02 | Stop reason: SDUPTHER

## 2018-12-18 ENCOUNTER — TELEPHONE (OUTPATIENT)
Dept: FAMILY MEDICINE CLINIC | Age: 53
End: 2018-12-18

## 2019-01-02 DIAGNOSIS — R05.9 COUGH: ICD-10-CM

## 2019-01-02 RX ORDER — PROMETHAZINE HYDROCHLORIDE AND CODEINE PHOSPHATE 6.25; 1 MG/5ML; MG/5ML
5 SYRUP ORAL 4 TIMES DAILY PRN
Qty: 120 ML | Refills: 0 | Status: SHIPPED | OUTPATIENT
Start: 2019-01-02 | End: 2019-01-09

## 2019-01-03 ENCOUNTER — TELEPHONE (OUTPATIENT)
Dept: FAMILY MEDICINE CLINIC | Age: 54
End: 2019-01-03

## 2019-01-04 ENCOUNTER — OFFICE VISIT (OUTPATIENT)
Dept: FAMILY MEDICINE CLINIC | Age: 54
End: 2019-01-04
Payer: COMMERCIAL

## 2019-01-04 VITALS
DIASTOLIC BLOOD PRESSURE: 70 MMHG | WEIGHT: 139 LBS | TEMPERATURE: 97.6 F | SYSTOLIC BLOOD PRESSURE: 110 MMHG | OXYGEN SATURATION: 98 % | BODY MASS INDEX: 24.62 KG/M2 | HEART RATE: 78 BPM

## 2019-01-04 DIAGNOSIS — B96.89 ACUTE BACTERIAL SINUSITIS: Primary | ICD-10-CM

## 2019-01-04 DIAGNOSIS — J01.90 ACUTE BACTERIAL SINUSITIS: Primary | ICD-10-CM

## 2019-01-04 DIAGNOSIS — R05.9 COUGH: ICD-10-CM

## 2019-01-04 PROCEDURE — G8427 DOCREV CUR MEDS BY ELIG CLIN: HCPCS | Performed by: FAMILY MEDICINE

## 2019-01-04 PROCEDURE — 3017F COLORECTAL CA SCREEN DOC REV: CPT | Performed by: FAMILY MEDICINE

## 2019-01-04 PROCEDURE — G8484 FLU IMMUNIZE NO ADMIN: HCPCS | Performed by: FAMILY MEDICINE

## 2019-01-04 PROCEDURE — 99213 OFFICE O/P EST LOW 20 MIN: CPT | Performed by: FAMILY MEDICINE

## 2019-01-04 PROCEDURE — G8420 CALC BMI NORM PARAMETERS: HCPCS | Performed by: FAMILY MEDICINE

## 2019-01-04 PROCEDURE — 1036F TOBACCO NON-USER: CPT | Performed by: FAMILY MEDICINE

## 2019-01-04 RX ORDER — AZITHROMYCIN 250 MG/1
TABLET, FILM COATED ORAL
Qty: 6 TABLET | Refills: 0 | Status: SHIPPED | OUTPATIENT
Start: 2019-01-04 | End: 2019-01-28 | Stop reason: ALTCHOICE

## 2019-01-25 ENCOUNTER — TELEPHONE (OUTPATIENT)
Dept: FAMILY MEDICINE CLINIC | Age: 54
End: 2019-01-25

## 2019-01-25 DIAGNOSIS — F98.8 ATTENTION DEFICIT DISORDER, UNSPECIFIED HYPERACTIVITY PRESENCE: ICD-10-CM

## 2019-01-28 ENCOUNTER — OFFICE VISIT (OUTPATIENT)
Dept: FAMILY MEDICINE CLINIC | Age: 54
End: 2019-01-28
Payer: COMMERCIAL

## 2019-01-28 VITALS
HEART RATE: 91 BPM | TEMPERATURE: 98.3 F | RESPIRATION RATE: 14 BRPM | WEIGHT: 144.6 LBS | SYSTOLIC BLOOD PRESSURE: 106 MMHG | OXYGEN SATURATION: 99 % | DIASTOLIC BLOOD PRESSURE: 64 MMHG | BODY MASS INDEX: 25.61 KG/M2

## 2019-01-28 DIAGNOSIS — Z12.39 SCREENING FOR BREAST CANCER: ICD-10-CM

## 2019-01-28 DIAGNOSIS — R05.3 PERSISTENT COUGH FOR 3 WEEKS OR LONGER: Primary | ICD-10-CM

## 2019-01-28 DIAGNOSIS — N92.1 MENORRHAGIA WITH IRREGULAR CYCLE: ICD-10-CM

## 2019-01-28 PROCEDURE — 99213 OFFICE O/P EST LOW 20 MIN: CPT | Performed by: NURSE PRACTITIONER

## 2019-01-28 PROCEDURE — 3017F COLORECTAL CA SCREEN DOC REV: CPT | Performed by: NURSE PRACTITIONER

## 2019-01-28 PROCEDURE — G8484 FLU IMMUNIZE NO ADMIN: HCPCS | Performed by: NURSE PRACTITIONER

## 2019-01-28 PROCEDURE — 1036F TOBACCO NON-USER: CPT | Performed by: NURSE PRACTITIONER

## 2019-01-28 PROCEDURE — G8427 DOCREV CUR MEDS BY ELIG CLIN: HCPCS | Performed by: NURSE PRACTITIONER

## 2019-01-28 PROCEDURE — G8419 CALC BMI OUT NRM PARAM NOF/U: HCPCS | Performed by: NURSE PRACTITIONER

## 2019-01-28 RX ORDER — PROMETHAZINE HYDROCHLORIDE AND PHENYLEPHRINE HYDROCHLORIDE 6.25; 5 MG/5ML; MG/5ML
5 SYRUP ORAL EVERY 6 HOURS
Qty: 120 ML | Refills: 0 | Status: SHIPPED | OUTPATIENT
Start: 2019-01-28

## 2019-01-28 ASSESSMENT — ENCOUNTER SYMPTOMS
SHORTNESS OF BREATH: 1
SINUS PRESSURE: 0
RHINORRHEA: 1
COUGH: 1
SORE THROAT: 0
WHEEZING: 1
CHEST TIGHTNESS: 1

## 2019-02-25 DIAGNOSIS — F98.8 ATTENTION DEFICIT DISORDER, UNSPECIFIED HYPERACTIVITY PRESENCE: ICD-10-CM

## 2019-03-25 DIAGNOSIS — F98.8 ATTENTION DEFICIT DISORDER, UNSPECIFIED HYPERACTIVITY PRESENCE: ICD-10-CM

## 2019-03-27 ENCOUNTER — OFFICE VISIT (OUTPATIENT)
Dept: FAMILY MEDICINE CLINIC | Age: 54
End: 2019-03-27
Payer: COMMERCIAL

## 2019-03-27 VITALS
WEIGHT: 150.2 LBS | HEART RATE: 84 BPM | DIASTOLIC BLOOD PRESSURE: 70 MMHG | OXYGEN SATURATION: 100 % | TEMPERATURE: 97.3 F | SYSTOLIC BLOOD PRESSURE: 110 MMHG | BODY MASS INDEX: 26.61 KG/M2

## 2019-03-27 DIAGNOSIS — G44.89 OTHER HEADACHE SYNDROME: ICD-10-CM

## 2019-03-27 DIAGNOSIS — J00 NASOPHARYNGITIS: Primary | ICD-10-CM

## 2019-03-27 PROCEDURE — 99213 OFFICE O/P EST LOW 20 MIN: CPT | Performed by: NURSE PRACTITIONER

## 2019-03-27 PROCEDURE — 3017F COLORECTAL CA SCREEN DOC REV: CPT | Performed by: NURSE PRACTITIONER

## 2019-03-27 PROCEDURE — G8484 FLU IMMUNIZE NO ADMIN: HCPCS | Performed by: NURSE PRACTITIONER

## 2019-03-27 PROCEDURE — G8419 CALC BMI OUT NRM PARAM NOF/U: HCPCS | Performed by: NURSE PRACTITIONER

## 2019-03-27 PROCEDURE — 1036F TOBACCO NON-USER: CPT | Performed by: NURSE PRACTITIONER

## 2019-03-27 PROCEDURE — G8427 DOCREV CUR MEDS BY ELIG CLIN: HCPCS | Performed by: NURSE PRACTITIONER

## 2019-03-27 ASSESSMENT — PATIENT HEALTH QUESTIONNAIRE - PHQ9
1. LITTLE INTEREST OR PLEASURE IN DOING THINGS: 0
SUM OF ALL RESPONSES TO PHQ QUESTIONS 1-9: 0
SUM OF ALL RESPONSES TO PHQ QUESTIONS 1-9: 0
2. FEELING DOWN, DEPRESSED OR HOPELESS: 0
SUM OF ALL RESPONSES TO PHQ9 QUESTIONS 1 & 2: 0

## 2019-03-27 ASSESSMENT — ENCOUNTER SYMPTOMS
SINUS PRESSURE: 0
COUGH: 1
VOMITING: 0
NAUSEA: 1
PHOTOPHOBIA: 0
RHINORRHEA: 1
SORE THROAT: 1

## 2019-04-24 DIAGNOSIS — F98.8 ATTENTION DEFICIT DISORDER, UNSPECIFIED HYPERACTIVITY PRESENCE: ICD-10-CM

## 2019-04-24 NOTE — TELEPHONE ENCOUNTER
Medication and Quantity requested: lisdexamfetamine (VYVANSE) 30 MG capsule       Last Visit  3/27/19    Pharmacy and phone number updated in EPIC:  yes

## 2019-05-14 ENCOUNTER — TELEPHONE (OUTPATIENT)
Dept: FAMILY MEDICINE CLINIC | Age: 54
End: 2019-05-14

## 2019-05-24 ENCOUNTER — TELEPHONE (OUTPATIENT)
Dept: FAMILY MEDICINE CLINIC | Age: 54
End: 2019-05-24

## 2019-05-24 DIAGNOSIS — F98.8 ATTENTION DEFICIT DISORDER, UNSPECIFIED HYPERACTIVITY PRESENCE: ICD-10-CM

## 2019-05-24 NOTE — TELEPHONE ENCOUNTER
Patient requesting a medication refill.   Medicationlisdexamfetamine (VYVANSE) 30 MG capsule  [facility] Christophe [address] [phone]  Last office visit: 03/27/2019  Next office visit: Visit date not found

## 2019-06-12 ENCOUNTER — TELEPHONE (OUTPATIENT)
Dept: FAMILY MEDICINE CLINIC | Age: 54
End: 2019-06-12

## 2019-06-12 RX ORDER — FEXOFENADINE HCL 180 MG/1
180 TABLET ORAL DAILY
Qty: 30 TABLET | Refills: 5 | Status: SHIPPED | OUTPATIENT
Start: 2019-06-12 | End: 2020-09-10 | Stop reason: SDUPTHER

## 2019-06-25 DIAGNOSIS — F98.8 ATTENTION DEFICIT DISORDER, UNSPECIFIED HYPERACTIVITY PRESENCE: ICD-10-CM

## 2019-06-25 NOTE — TELEPHONE ENCOUNTER
Patient requesting a medication refill.   Medication  lisdexamfetamine (VYVANSE) 30 MG capsule [[id number]]   Pharmacy [facility] [address] [facility] [address] [facility]  Last office visit: 03/27/2019  Next office visit: Visit date not found

## 2019-07-20 ENCOUNTER — OFFICE VISIT (OUTPATIENT)
Dept: FAMILY MEDICINE CLINIC | Age: 54
End: 2019-07-20
Payer: COMMERCIAL

## 2019-07-20 VITALS
TEMPERATURE: 96.9 F | DIASTOLIC BLOOD PRESSURE: 70 MMHG | SYSTOLIC BLOOD PRESSURE: 138 MMHG | BODY MASS INDEX: 24.91 KG/M2 | HEART RATE: 78 BPM | WEIGHT: 140.6 LBS | HEIGHT: 63 IN | OXYGEN SATURATION: 98 %

## 2019-07-20 DIAGNOSIS — N93.9 VAGINAL BLEEDING, ABNORMAL: Primary | ICD-10-CM

## 2019-07-20 PROCEDURE — 99213 OFFICE O/P EST LOW 20 MIN: CPT | Performed by: FAMILY MEDICINE

## 2019-07-20 PROCEDURE — 1036F TOBACCO NON-USER: CPT | Performed by: FAMILY MEDICINE

## 2019-07-20 PROCEDURE — 3017F COLORECTAL CA SCREEN DOC REV: CPT | Performed by: FAMILY MEDICINE

## 2019-07-20 PROCEDURE — G8420 CALC BMI NORM PARAMETERS: HCPCS | Performed by: FAMILY MEDICINE

## 2019-07-20 PROCEDURE — G8427 DOCREV CUR MEDS BY ELIG CLIN: HCPCS | Performed by: FAMILY MEDICINE

## 2019-07-20 RX ORDER — FLUOXETINE 10 MG/1
10 CAPSULE ORAL DAILY
Qty: 30 CAPSULE | Refills: 3 | Status: SHIPPED | OUTPATIENT
Start: 2019-07-20 | End: 2020-07-16 | Stop reason: SDUPTHER

## 2019-07-20 NOTE — PROGRESS NOTES
Tobacco Use    Smoking status: Former Smoker    Smokeless tobacco: Never Used   Substance Use Topics    Alcohol use: Yes     Alcohol/week: 0.0 standard drinks     Comment: occ       Family History   Problem Relation Age of Onset    Diabetes Father        OBJECTIVE:  /70 (Site: Left Upper Arm, Position: Sitting, Cuff Size: Large Adult)   Pulse 78   Temp 96.9 °F (36.1 °C) (Tympanic)   Ht 5' 2.99\" (1.6 m)   Wt 140 lb 9.6 oz (63.8 kg)   SpO2 98%   BMI 24.91 kg/m²   GEN: Tearful, still with vaginal bleeding, not feeling well  HEENT:  NCAT, TMs:normal and throat: clear  NECK:  Supple without adenopathy. CV:  Regular rate and rhythm, S1 and S2 normal, no murmurs, clicks  PULM:  Chest is clear, no wheezing ,  symmetric air entry throughout both lung fields. ABD: Soft, NT, masses appreciated  EXT: No rash or edema  NEURO: Alert oriented ×3, no assistive device    ASSESSMENT/PLAN:  1.  Vaginal bleeding, abnormal  To emergency room for labs and ultrasound and GYN consult to help stop bleeding    Prozac when feeling better 10 mg for irritability    Called ahead to  [facility] emergency room

## 2019-07-23 ENCOUNTER — HOSPITAL ENCOUNTER (EMERGENCY)
Age: 54
Discharge: LEFT BEFORE DISCHARGE | End: 2019-07-23
Payer: COMMERCIAL

## 2019-07-23 ENCOUNTER — TELEPHONE (OUTPATIENT)
Dept: FAMILY MEDICINE CLINIC | Age: 54
End: 2019-07-23

## 2019-07-23 VITALS
HEIGHT: 62 IN | HEART RATE: 99 BPM | RESPIRATION RATE: 14 BRPM | OXYGEN SATURATION: 99 % | TEMPERATURE: 98.4 F | SYSTOLIC BLOOD PRESSURE: 115 MMHG | DIASTOLIC BLOOD PRESSURE: 74 MMHG | BODY MASS INDEX: 25.58 KG/M2 | WEIGHT: 139 LBS

## 2019-07-23 DIAGNOSIS — N93.9 ABNORMAL UTERINE BLEEDING: Primary | ICD-10-CM

## 2019-07-23 PROCEDURE — 4500000002 HC ER NO CHARGE

## 2019-07-23 ASSESSMENT — PAIN SCALES - GENERAL: PAINLEVEL_OUTOF10: 7

## 2019-07-25 DIAGNOSIS — F98.8 ATTENTION DEFICIT DISORDER, UNSPECIFIED HYPERACTIVITY PRESENCE: ICD-10-CM

## 2019-08-15 ENCOUNTER — TELEPHONE (OUTPATIENT)
Dept: FAMILY MEDICINE CLINIC | Age: 54
End: 2019-08-15

## 2019-08-26 ENCOUNTER — TELEPHONE (OUTPATIENT)
Dept: FAMILY MEDICINE CLINIC | Age: 54
End: 2019-08-26

## 2019-08-26 DIAGNOSIS — F98.8 ATTENTION DEFICIT DISORDER, UNSPECIFIED HYPERACTIVITY PRESENCE: ICD-10-CM

## 2019-09-26 ENCOUNTER — TELEPHONE (OUTPATIENT)
Dept: FAMILY MEDICINE CLINIC | Age: 54
End: 2019-09-26

## 2019-09-26 DIAGNOSIS — F98.8 ATTENTION DEFICIT DISORDER, UNSPECIFIED HYPERACTIVITY PRESENCE: ICD-10-CM

## 2019-10-15 ENCOUNTER — TELEPHONE (OUTPATIENT)
Dept: FAMILY MEDICINE CLINIC | Age: 54
End: 2019-10-15

## 2019-10-21 ENCOUNTER — TELEPHONE (OUTPATIENT)
Dept: FAMILY MEDICINE CLINIC | Age: 54
End: 2019-10-21

## 2019-11-12 ENCOUNTER — OFFICE VISIT (OUTPATIENT)
Dept: FAMILY MEDICINE CLINIC | Age: 54
End: 2019-11-12
Payer: COMMERCIAL

## 2019-11-12 VITALS
SYSTOLIC BLOOD PRESSURE: 118 MMHG | HEART RATE: 113 BPM | TEMPERATURE: 98.2 F | OXYGEN SATURATION: 98 % | BODY MASS INDEX: 28.06 KG/M2 | WEIGHT: 153.4 LBS | DIASTOLIC BLOOD PRESSURE: 80 MMHG | RESPIRATION RATE: 16 BRPM

## 2019-11-12 DIAGNOSIS — J06.9 ACUTE URI: ICD-10-CM

## 2019-11-12 DIAGNOSIS — R05.9 COUGH: Primary | ICD-10-CM

## 2019-11-12 PROCEDURE — G8419 CALC BMI OUT NRM PARAM NOF/U: HCPCS | Performed by: NURSE PRACTITIONER

## 2019-11-12 PROCEDURE — G8484 FLU IMMUNIZE NO ADMIN: HCPCS | Performed by: NURSE PRACTITIONER

## 2019-11-12 PROCEDURE — 1036F TOBACCO NON-USER: CPT | Performed by: NURSE PRACTITIONER

## 2019-11-12 PROCEDURE — 3017F COLORECTAL CA SCREEN DOC REV: CPT | Performed by: NURSE PRACTITIONER

## 2019-11-12 PROCEDURE — 99213 OFFICE O/P EST LOW 20 MIN: CPT | Performed by: NURSE PRACTITIONER

## 2019-11-12 PROCEDURE — G8427 DOCREV CUR MEDS BY ELIG CLIN: HCPCS | Performed by: NURSE PRACTITIONER

## 2019-11-12 ASSESSMENT — ENCOUNTER SYMPTOMS
COUGH: 1
SORE THROAT: 0
SINUS PRESSURE: 0
RHINORRHEA: 1

## 2019-11-19 DIAGNOSIS — F98.8 ATTENTION DEFICIT DISORDER, UNSPECIFIED HYPERACTIVITY PRESENCE: ICD-10-CM

## 2019-12-02 ENCOUNTER — TELEPHONE (OUTPATIENT)
Dept: FAMILY MEDICINE CLINIC | Age: 54
End: 2019-12-02

## 2019-12-17 DIAGNOSIS — F98.8 ATTENTION DEFICIT DISORDER, UNSPECIFIED HYPERACTIVITY PRESENCE: ICD-10-CM

## 2020-01-20 NOTE — TELEPHONE ENCOUNTER
Fax received from Catrina S Pamela Javier requesting clarification of directions for Vyvanse by sending a new rx with either 1 cap or 2 caps daily.

## 2020-01-20 NOTE — TELEPHONE ENCOUNTER
Spoke with patient and she states that in order to get vyvanse filled the pharmacy needed clarification that she is only taking one capsule daily. Called and informed pharmacy. Med list updated to reflect once daily.

## 2020-02-18 ENCOUNTER — OFFICE VISIT (OUTPATIENT)
Dept: FAMILY MEDICINE CLINIC | Age: 55
End: 2020-02-18
Payer: COMMERCIAL

## 2020-02-18 VITALS
WEIGHT: 154 LBS | SYSTOLIC BLOOD PRESSURE: 128 MMHG | BODY MASS INDEX: 28.17 KG/M2 | OXYGEN SATURATION: 98 % | DIASTOLIC BLOOD PRESSURE: 78 MMHG | HEART RATE: 93 BPM

## 2020-02-18 PROCEDURE — 3017F COLORECTAL CA SCREEN DOC REV: CPT | Performed by: FAMILY MEDICINE

## 2020-02-18 PROCEDURE — G8427 DOCREV CUR MEDS BY ELIG CLIN: HCPCS | Performed by: FAMILY MEDICINE

## 2020-02-18 PROCEDURE — G8484 FLU IMMUNIZE NO ADMIN: HCPCS | Performed by: FAMILY MEDICINE

## 2020-02-18 PROCEDURE — 1036F TOBACCO NON-USER: CPT | Performed by: FAMILY MEDICINE

## 2020-02-18 PROCEDURE — 99214 OFFICE O/P EST MOD 30 MIN: CPT | Performed by: FAMILY MEDICINE

## 2020-02-18 PROCEDURE — G8419 CALC BMI OUT NRM PARAM NOF/U: HCPCS | Performed by: FAMILY MEDICINE

## 2020-02-18 RX ORDER — PANTOPRAZOLE SODIUM 40 MG/1
40 TABLET, DELAYED RELEASE ORAL
Qty: 30 TABLET | Refills: 5 | Status: SHIPPED | OUTPATIENT
Start: 2020-02-18 | End: 2020-05-12 | Stop reason: SDUPTHER

## 2020-02-18 RX ORDER — ALBUTEROL SULFATE 90 UG/1
2 AEROSOL, METERED RESPIRATORY (INHALATION) EVERY 4 HOURS PRN
Qty: 1 INHALER | Refills: 2 | Status: SHIPPED | OUTPATIENT
Start: 2020-02-18

## 2020-02-18 ASSESSMENT — PATIENT HEALTH QUESTIONNAIRE - PHQ9
1. LITTLE INTEREST OR PLEASURE IN DOING THINGS: 0
SUM OF ALL RESPONSES TO PHQ QUESTIONS 1-9: 0
SUM OF ALL RESPONSES TO PHQ9 QUESTIONS 1 & 2: 0
SUM OF ALL RESPONSES TO PHQ QUESTIONS 1-9: 0
2. FEELING DOWN, DEPRESSED OR HOPELESS: 0

## 2020-02-18 ASSESSMENT — ENCOUNTER SYMPTOMS
COUGH: 1
WHEEZING: 0
SHORTNESS OF BREATH: 1

## 2020-02-18 NOTE — PROGRESS NOTES
Subjective:      Patient ID: Larry Cantu is a 47 y.o. female. HPI  Follow-up on ADD. Patient states the Vyvanse continues to work. She also notes some exercise induced asthma type symptoms and would like to have an inhaler particularly when she plays sports and is active. Some SOB and cough with volleyball. Also complains of GERD xs a few months. No help with Pepto Bismol or Tums. Review of Systems   Constitutional: Negative. Respiratory: Positive for cough and shortness of breath. Negative for wheezing. Cardiovascular: Negative. Gastrointestinal:        GERD   Genitourinary: Negative. Musculoskeletal: Negative. Neurological: Negative. Objective:   Physical Exam  Constitutional:       General: She is not in acute distress. Neck:      Musculoskeletal: Neck supple. Thyroid: No thyromegaly. Vascular: No carotid bruit. Cardiovascular:      Rate and Rhythm: Normal rate and regular rhythm. Pulmonary:      Effort: Pulmonary effort is normal.      Breath sounds: Normal breath sounds. No wheezing or rales. Lymphadenopathy:      Cervical: No cervical adenopathy. Skin:     General: Skin is warm and dry. Neurological:      Mental Status: She is alert and oriented to person, place, and time. Psychiatric:         Behavior: Behavior normal.         Thought Content: Thought content normal.         Judgment: Judgment normal.         Assessment/Plan:    Rosa Regan was seen today for medication check, medication refill and cough. Diagnoses and all orders for this visit:    Cough  -     albuterol sulfate HFA (PROAIR HFA) 108 (90 Base) MCG/ACT inhaler; Inhale 2 puffs into the lungs every 4 hours as needed for Wheezing    Attention deficit disorder, unspecified hyperactivity presence  -     lisdexamfetamine (VYVANSE) 30 MG capsule; Take 1 capsule by mouth every morning for 30 days.   OARRS report reviewed and no inconsistencies noted   The patient understands the risks of dependency/addiction with Vyvanse and will take as little as possible and discontinue as soon as possible     Gastroesophageal reflux disease without esophagitis  -     pantoprazole (PROTONIX) 40 MG tablet;  Take 1 tablet by mouth every morning (before breakfast)  EGD if persistently needs Protonix       Cheri Cage MD

## 2020-03-16 ENCOUNTER — NURSE TRIAGE (OUTPATIENT)
Dept: OTHER | Facility: CLINIC | Age: 55
End: 2020-03-16

## 2020-03-16 NOTE — TELEPHONE ENCOUNTER
Call received from [facility]      Reason for Disposition   Wheezing is present    Answer Assessment - Initial Assessment Questions  1. ONSET: \"When did the cough begin?\"       2.5 weeks ago    2. SEVERITY: \"How bad is the cough today? \"       Very light cough today    3. RESPIRATORY DISTRESS: \"Describe your breathing. \"       Feels slightly SOB - saw PCP when symptoms started after being in [place] and he started her on inhaler - SOB with exertion    4. FEVER: \"Do you have a fever? \" If so, ask: \"What is your temperature, how was it measured, and when did it start? \"      Denies    5. HEMOPTYSIS: \"Are you coughing up any blood? \" If so ask: \"How much? \" (flecks, streaks, tablespoons, etc.)      Denies    6. TREATMENT: \"What have you done so far to treat the cough? \" (e.g., meds, fluids, humidifier)      Inhaler only    7. CARDIAC HISTORY: \"Do you have any history of heart disease? \" (e.g., heart attack, congestive heart failure)       Denies    8. LUNG HISTORY: \"Do you have any history of lung disease? \"  (e.g., pulmonary embolus, asthma, emphysema)      Denies    9. PE RISK FACTORS: \"Do you have a history of blood clots? \" (or: recent major surgery, recent prolonged travel, bedridden)      Denies    10. OTHER SYMPTOMS: \"Do you have any other symptoms? (e.g., runny nose, wheezing, chest pain)       Nasal congestion with clear mucous, headache that she rates as 1/10    12. TRAVEL: \"Have you traveled out of the country in the last month? \" (e.g., travel history, exposures)        Travel to [place] 3 weeks ago. Protocols used: [id number]    Caller reports symptoms as documented above. Caller informed of disposition. Soft transfer to pre-service center to schedule apt as recommended. Care advice as documented. Please do not respond to the triage nurse through this encounter. Any subsequent communication should be directly with the patient.

## 2020-03-16 NOTE — TELEPHONE ENCOUNTER
Pt needs her medication to go to [facility] [address], [facility] .  Please advise

## 2020-03-17 ENCOUNTER — OFFICE VISIT (OUTPATIENT)
Dept: PRIMARY CARE CLINIC | Age: 55
End: 2020-03-17
Payer: COMMERCIAL

## 2020-03-17 VITALS — HEART RATE: 105 BPM | OXYGEN SATURATION: 97 %

## 2020-03-17 PROCEDURE — 99211 OFF/OP EST MAY X REQ PHY/QHP: CPT | Performed by: STUDENT IN AN ORGANIZED HEALTH CARE EDUCATION/TRAINING PROGRAM

## 2020-03-17 RX ORDER — DOXYCYCLINE HYCLATE 100 MG
100 TABLET ORAL 2 TIMES DAILY
Qty: 10 TABLET | Refills: 0 | Status: SHIPPED | OUTPATIENT
Start: 2020-03-17 | End: 2020-03-22

## 2020-03-17 NOTE — PATIENT INSTRUCTIONS
Steps to help prevent the spread of COVID-19 if you are sick  SOURCE - https://ramos-martinez.info/. html     Stay home except to get medical care   ; Stay home: People who are mildly ill with COVID-19 are able to isolate at home during their illness. You should restrict activities outside your home, except for getting medical care.   ; Avoid public areas: Do not go to work, school, or public areas.   ; Avoid public transportation: Avoid using public transportation, ride-sharing, or taxis.  ; Separate yourself from other people and animals in your home   ; Stay away from others: As much as possible, you should stay in a specific room and away from other people in your home. Also, you should use a separate bathroom, if available.   ; Limit contact with pets & animals: You should restrict contact with pets and other animals while you are sick with COVID-19, just like you would around other people. Although there have not been reports of pets or other animals becoming sick with COVID-19, it is still recommended that people sick with COVID-19 limit contact with animals until more information is known about the virus. ; When possible, have another member of your household care for your animals while you are sick. If you are sick with COVID-19, avoid contact with your pet, including petting, snuggling, being kissed or licked, and sharing food. If you must care for your pet or be around animals while you are sick, wash your hands before and after you interact with pets and wear a facemask. See COVID-19 and Animals for more information. Other considerations   The ill person should eat/be fed in their room if possible. Non-disposable  items used should be handled with gloves and washed with hot water or in a . Clean hands after handling used  items.  If possible, dedicate a lined trash can for the ill person.  Use gloves when removing garbage bags, handling, and disposing of trash. Wash hands after handling or disposing of trash.  Consider consulting with your local health department about trash disposal guidance if available. Information for Household Members and Caregivers of Someone who is Sick   Call ahead before visiting your doctor   Call ahead: If you have a medical appointment, call the healthcare provider and tell them that you have or may have COVID-19. This will help the healthcare provider's office take steps to keep other people from getting infected or exposed. Wear a facemask if you are sick   ; If you are sick: You should wear a facemask when you are around other people (e.g., sharing a room or vehicle) or pets and before you enter a healthcare provider's office. ; If you are caring for others: If the person who is sick is not able to wear a facemask (for example, because it causes trouble breathing), then people who live with the person who is sick should not stay in the same room with them, or they should wear a facemask if they enter a room with the person who is sick. Cover your coughs and sneezes   ; Cover: Cover your mouth and nose with a tissue when you cough or sneeze.   ; Dispose: Throw used tissues in a lined trash can.   ; Wash hands: Immediately wash your hands with soap and water for at least 20 seconds or, if soap and water are not available, clean your hands with an alcohol-based hand  that contains at least 60% alcohol. Clean your hands often   ;  Wash hands: Wash your hands often with soap and water for at least 20 seconds, especially after blowing your nose, coughing, or sneezing; going to the bathroom; and before eating or preparing food.   ; Hand : If soap and water are not readily available, use an alcohol-based hand  with at least 60% alcohol, covering all surfaces of your hands and rubbing them together until they feel dry.   ; Soap and water: Soap and water are the best option if hands are visibly dirty.   ; Avoid touching: Avoid touching your eyes, nose, and mouth with unwashed hands. Handwashing Tips   ; Wet your hands with clean, running water (warm or cold), turn off the tap, and apply soap.  ; Lather your hands by rubbing them together with the soap. Lather the backs of your hands, between your fingers, and under your nails. ; Scrub your hands for at least 20 seconds. Need a timer? Hum the [place] from beginning to end twice.  ; Rinse your hands well under clean, running water.  ; Dry your hands using a clean towel or air dry them. Avoid sharing personal household items   ; Do not share: You should not share dishes, drinking glasses, cups, eating utensils, towels, or bedding with other people or pets in your home.   ; Wash thoroughly after use: After using these items, they should be washed thoroughly with soap and water. Clean all high-touch surfaces everyday   ; Clean and disinfect: Practice routine cleaning of high touch surfaces.  ; High touch surfaces include counters, tabletops, doorknobs, bathroom fixtures, toilets, phones, keyboards, tablets, and bedside tables.  ; Disinfect areas with bodily fluids: Also, clean any surfaces that may have blood, stool, or body fluids on them.   ; Household : Use a household cleaning spray or wipe, according to the label instructions. Labels contain instructions for safe and effective use of the cleaning product including precautions you should take when applying the product, such as wearing gloves and making sure you have good ventilation during use of the product.     Monitor your symptoms   Seek medical attention: Seek prompt medical attention if your illness is worsening     (e.g., difficulty breathing).   ; Call your doctor: Before seeking care, call your healthcare provider and tell them that you have, or are being evaluated for, COVID-19.   ; Wear a facemask when sick: Put on a facemask before you enter the facility. These steps will help the healthcare provider's office to keep other people in the office or waiting room from getting infected or exposed. ; Alert health department: Ask your healthcare provider to call the local or state health department. Persons who are placed under active monitoring or facilitated self-monitoring should follow instructions provided by their local health department or occupational health professionals, as appropriate.  ; Call 911 if you have a medical emergency: If you have a medical emergency and need to call 911, notify the dispatch personnel that you have, or are being evaluated for COVID-19. If possible, put on a facemask before emergency medical services arrive.

## 2020-03-17 NOTE — TELEPHONE ENCOUNTER
Script canceled at [facility] one teed up to [facility]

## 2020-03-17 NOTE — TELEPHONE ENCOUNTER
Pt states rx sent to wrong pharmacy. The correct pharmacy:    [facility]  [phone]      Medication:    lisdexamfetamine (VYVANSE) 30 MG capsule     Lainey Jonas           3/17/20 1:49 PM   Note      LVM for pt please verify how she is taking her vyvanse is it 1 daily or 2 daily                 3/17/20 1:49 PM   Lainey Jonas routed this conversation to [facility] [address] [facility]           3/17/20 2:20 PM   Note      Pt.  Is currently taking 1 pill per day please advise                  3/17/20 2:20 PM   Beatriz Rivero routed this conversation to [facility] [address] [facility]           3/17/20 2:28 PM   Note      Script teed up with correct directions

## 2020-03-17 NOTE — PROGRESS NOTES
3/17/2020    FLU/COVID-19 CLINIC EVALUATION    HPI c/o 2 week runny nose, chills. Has been to [employer]. Inhalers have helped. SYMPTOMS:    Symptom duration, days:  [] 1   [] 2   [] 3   [] 4   [] 5   [] 6   [] 7   [] 8   [] 9   [] 10   [] 11   [] 12   [] 13   [x] 14 or greater    Symptom course:   [] Worsening     [] Stable     [] Improving    [] Fevers  [] Symptom (not measured)  [] Measured (Result: )  [] Chills  [] Cough  [] Coughing up blood  [] Chest Congestion  [] Nasal Congestion  [x] Feeling short of breath  [x] Sometimes  [] Frequently  [] All the time  [] Chest pain  [] Headaches  []Tolerable  [] Severe  [] Sore throat  [] Muscle aches  [] Nausea  [] Vomiting  []Unable to keep fluids down  [] Diarrhea  []Severe    [] OTHER SYMPTOMS:      RISK FACTORS:    [] Pregnant or possibly pregnant  [] Age over 61  [] Diabetes  [] Heart disease  [] Asthma  [] COPD/Other chronic lung diseases  [] Active Cancer  [] On Chemotherapy  [] Taking oral steroids  [] History Lymphoma/Leukemia  [] Close contact with a lab confirmed COVID-19 patient within 14 days of symptom onset  [] History of travel from affected geographical areas within 14 days of symptom onset       VITALS:  Vitals:    03/17/20 1514   Pulse: 105   SpO2: 97%        TESTS:    POCT FLU:  [] Positive     []Negative    [] COVID-19 Test sent:    No visits with results within 1 Month(s) from this visit. Latest known visit with results is:   No results found for any previous visit. ASSESSMENT:    [] Flu  [] Possible COVID-19    PLAN:    [] Discharge home with written instructions for:  [] Flu management  [] Possible COVID-19 infection with self-quarantine and management of symptoms  [] Follow-up with primary care physician or emergency department if worsens  [] Evaluation per physician/nurse practitioner in clinic  1. Flu-like symptoms  - Miscellaneous Sendout 1    2. Acute bacterial sinusitis  Continue with symptomatic management.  Recommend increased water intake as well as saline irrigation, mucolytics, and antihistamines as needed. Advised to call back if no improvement over the next 4-7 days. Doxy         An  electronic signature was used to authenticate this note.      --ANDREW Zamora - CNP on 4/15/2020 at 10:13 AM

## 2020-04-16 ENCOUNTER — TELEPHONE (OUTPATIENT)
Dept: FAMILY MEDICINE CLINIC | Age: 55
End: 2020-04-16

## 2020-04-16 RX ORDER — CYCLOBENZAPRINE HCL 10 MG
10 TABLET ORAL 3 TIMES DAILY PRN
Qty: 30 TABLET | Refills: 0 | Status: SHIPPED | OUTPATIENT
Start: 2020-04-16 | End: 2020-04-26

## 2020-04-16 RX ORDER — CYCLOBENZAPRINE HCL 10 MG
10 TABLET ORAL 3 TIMES DAILY PRN
Qty: 30 TABLET | Refills: 0 | Status: SHIPPED | OUTPATIENT
Start: 2020-04-16 | End: 2020-04-16 | Stop reason: SDUPTHER

## 2020-04-17 ENCOUNTER — TELEPHONE (OUTPATIENT)
Dept: FAMILY MEDICINE CLINIC | Age: 55
End: 2020-04-17

## 2020-04-18 ENCOUNTER — TELEPHONE (OUTPATIENT)
Dept: FAMILY MEDICINE CLINIC | Age: 55
End: 2020-04-18

## 2020-04-20 RX ORDER — LIDOCAINE 40 MG/G
CREAM TOPICAL
Qty: 60 G | Refills: 1 | Status: SHIPPED | OUTPATIENT
Start: 2020-04-20 | End: 2020-04-20 | Stop reason: SDUPTHER

## 2020-04-20 RX ORDER — LIDOCAINE 40 MG/G
CREAM TOPICAL
Qty: 60 G | Refills: 1 | Status: SHIPPED | OUTPATIENT
Start: 2020-04-20

## 2020-05-12 RX ORDER — PANTOPRAZOLE SODIUM 40 MG/1
40 TABLET, DELAYED RELEASE ORAL
Qty: 30 TABLET | Refills: 5 | Status: SHIPPED | OUTPATIENT
Start: 2020-05-12 | End: 2020-09-10 | Stop reason: SDUPTHER

## 2020-05-12 NOTE — TELEPHONE ENCOUNTER
Medication and Quantity requested: protonix 40mg  vyvanse 30mg    Last Visit 02.18.20    Pharmacy and phone number updated in [facility]:  yes    franklyn angulo

## 2020-05-15 ENCOUNTER — TELEPHONE (OUTPATIENT)
Dept: FAMILY MEDICINE CLINIC | Age: 55
End: 2020-05-15

## 2020-06-16 ENCOUNTER — TELEPHONE (OUTPATIENT)
Dept: FAMILY MEDICINE CLINIC | Age: 55
End: 2020-06-16

## 2020-08-17 NOTE — TELEPHONE ENCOUNTER
----- Message from Rhonda Glass sent at 8/17/2020  3:20 PM EDT -----  Subject: Message to Provider    QUESTIONS  Information for Provider? Pt stated she would like a refill for vyvance. It was not listed in her medications list.  ---------------------------------------------------------------------------  --------------  CALL BACK INFO  What is the best way for the office to contact you? OK to leave message on   voicemail  Preferred Call Back Phone Number? [phone]  ---------------------------------------------------------------------------  --------------  SCRIPT ANSWERS  Relationship to Patient?  Self

## 2020-08-21 ENCOUNTER — TELEPHONE (OUTPATIENT)
Dept: FAMILY MEDICINE CLINIC | Age: 55
End: 2020-08-21

## 2020-08-21 NOTE — TELEPHONE ENCOUNTER
----- Message from Elisepatriziasuman Chen sent at 8/21/2020 12:03 PM EDT -----  Subject: Message to Provider    QUESTIONS  Information for Provider? Patient is not able to make her 12:45 over the   phone appointment with Dr. Zoraida Hernandez because   she didn't have access to a phone. So she rescheduled for the 3rd of September. ---------------------------------------------------------------------------  --------------  Leif MURRY  What is the best way for the office to contact you? OK to leave message on   voicemail  Preferred Call Back Phone Number? [phone]  ---------------------------------------------------------------------------  --------------  SCRIPT ANSWERS  Relationship to Patient?  Self

## 2020-09-10 ENCOUNTER — OFFICE VISIT (OUTPATIENT)
Dept: FAMILY MEDICINE CLINIC | Age: 55
End: 2020-09-10
Payer: COMMERCIAL

## 2020-09-10 VITALS
HEART RATE: 85 BPM | SYSTOLIC BLOOD PRESSURE: 124 MMHG | BODY MASS INDEX: 27.55 KG/M2 | DIASTOLIC BLOOD PRESSURE: 86 MMHG | OXYGEN SATURATION: 97 % | WEIGHT: 150.6 LBS

## 2020-09-10 PROCEDURE — 99214 OFFICE O/P EST MOD 30 MIN: CPT | Performed by: FAMILY MEDICINE

## 2020-09-10 PROCEDURE — 3017F COLORECTAL CA SCREEN DOC REV: CPT | Performed by: FAMILY MEDICINE

## 2020-09-10 PROCEDURE — G8419 CALC BMI OUT NRM PARAM NOF/U: HCPCS | Performed by: FAMILY MEDICINE

## 2020-09-10 PROCEDURE — G8427 DOCREV CUR MEDS BY ELIG CLIN: HCPCS | Performed by: FAMILY MEDICINE

## 2020-09-10 PROCEDURE — 1036F TOBACCO NON-USER: CPT | Performed by: FAMILY MEDICINE

## 2020-09-10 RX ORDER — PANTOPRAZOLE SODIUM 40 MG/1
40 TABLET, DELAYED RELEASE ORAL
Qty: 30 TABLET | Refills: 5 | Status: SHIPPED | OUTPATIENT
Start: 2020-09-10 | End: 2021-05-18 | Stop reason: SDUPTHER

## 2020-09-10 RX ORDER — FEXOFENADINE HCL 180 MG/1
180 TABLET ORAL DAILY
Qty: 30 TABLET | Refills: 5 | Status: SHIPPED
Start: 2020-09-10 | End: 2020-12-16

## 2020-09-10 ASSESSMENT — ENCOUNTER SYMPTOMS: RESPIRATORY NEGATIVE: 1

## 2020-09-10 NOTE — PROGRESS NOTES
Subjective:      Patient ID: Dejuan Rees is a 47 y.o. female. HPI   States she has been laid off due to [employer] for 6 months now. 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 .    ADD:stable,and continues to need her Vyvanse despite not working. 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  GERD:states she needs meds refilled, she states with the medication she does not really have any symptoms. Allergic rhinitis: non seasonable, uses Allegra which she states helps. She states it is been no better or worse this season. .  Review of Systems   Constitutional: Negative. HENT: Positive for congestion and postnasal drip. Respiratory: Negative. Cardiovascular: Negative. Gastrointestinal:        GERD   Endocrine: Negative. Genitourinary: Negative. Musculoskeletal: Negative. Neurological: Negative. Psychiatric/Behavioral:        ADD       Objective:   Physical Exam  Constitutional:       General: She is not in acute distress. Neck:      Musculoskeletal: Neck supple. Thyroid: No thyromegaly. Vascular: No carotid bruit. Cardiovascular:      Rate and Rhythm: Normal rate and regular rhythm. Pulmonary:      Effort: Pulmonary effort is normal.      Breath sounds: Normal breath sounds. No wheezing or rales. Lymphadenopathy:      Cervical: No cervical adenopathy. Skin:     General: Skin is warm and dry. Neurological:      Mental Status: She is alert and oriented to person, place, and time. Psychiatric:         Behavior: Behavior normal.         Thought Content: Thought content normal.         Judgment: Judgment normal.         Assessment/Plan:    Tana Mercer was seen today for follow-up. Diagnoses and all orders for this visit:    Attention deficit disorder, unspecified hyperactivity presence  -     lisdexamfetamine (VYVANSE) 30 MG capsule; Take 1 capsule by mouth every morning for 30 days.   OARRS report reviewed and no inconsistencies noted   The patient understands the risks of dependency/addiction with Vyvanse and will take as little as possible and discontinue as soon as possible  Return 3 months    Gastroesophageal reflux disease without esophagitis  Generally well controlled. Continue current treatment. -     pantoprazole (PROTONIX) 40 MG tablet; Take 1 tablet by mouth every morning (before breakfast)  We discussed getting EGD if she is on chronic Protonix. Allergic rhinitis, unspecified seasonality, unspecified trigger  -     fexofenadine (ALLEGRA) 180 MG tablet; Take 1 tablet by mouth daily  Patient feels this prescription is adequate and does not feel she needs any additional treatment at this time  Well adult exam  -     CBC Auto Differential; Future  -     Comprehensive Metabolic Panel; Future  -     Lipid Panel; Future  -     HIV Screen; Future  -     Hepatitis C Antibody; Future  -     TSH with Reflex; Future      Health Maintenance reviewed with the patient. She is due for her mammogram.  She did not want to get her flu shot done at this time. Shingrix was recommended. She is also due for her Pap smear. She will decide whether to go to her gynecologist or come back here for her Pap.     Regina Rodriguez MD

## 2020-10-05 ENCOUNTER — TELEPHONE (OUTPATIENT)
Dept: FAMILY MEDICINE CLINIC | Age: 55
End: 2020-10-05

## 2020-10-05 NOTE — TELEPHONE ENCOUNTER
----- Message from Colby Harmanlo sent at 10/5/2020  9:33 AM EDT -----  Subject: Message to Provider    QUESTIONS  Information for Provider? Pt says the medication that was prescribed for   her sinus infection her insurance doesn't cover she is needing an   alternative. pt is back working and is needing it ASAP.   ---------------------------------------------------------------------------  --------------  [address]  What is the best way for the office to contact you? OK to leave message on   voicemail  Preferred Call Back Phone Number? [phone]  ---------------------------------------------------------------------------  --------------  SCRIPT ANSWERS  Relationship to Patient?  Self

## 2020-10-05 NOTE — TELEPHONE ENCOUNTER
I am not sure which medications she is talking about. She was given Allegra on 9/10 for allergic rhinitis. Is that what she is talking about? .  If so all of those medications are over-the-counter i.e. Allegra, Claritin, Zyrtec, etc  If someone else gave her medication for a sinus infection, I am uncertain what that is.

## 2020-10-06 NOTE — TELEPHONE ENCOUNTER
Spoke with pt and she believes it was the [place], was notified this was sent in on 09/10/2020 #30, 5RF

## 2020-10-19 NOTE — TELEPHONE ENCOUNTER
Medication and Quantity requested: lisdexamfetamine (VYVANSE) 30 MG capsule         Last Visit  9/10/20    Pharmacy and phone number updated in [facility]:  Yes  Dia Salinas

## 2020-11-17 NOTE — TELEPHONE ENCOUNTER
Medication and Quantity requested: lisdexamfetamine (VYVANSE) 30 MG capsule  #30    Last Visit  9/10/20    Last Filled  10/19/20    Pharmacy and phone number updated in [facility]:  Yes, [facility]

## 2020-12-15 ENCOUNTER — TELEPHONE (OUTPATIENT)
Dept: FAMILY MEDICINE CLINIC | Age: 55
End: 2020-12-15

## 2020-12-15 NOTE — TELEPHONE ENCOUNTER
Patient states she had a flu shot at [employer] yesterday and feels nauseous.  Is this normal?

## 2020-12-15 NOTE — TELEPHONE ENCOUNTER
Medication:   Requested Prescriptions     Pending Prescriptions Disp Refills    lisdexamfetamine (VYVANSE) 30 MG capsule 30 capsule 0     Sig: Take 1 capsule by mouth every morning for 30 days. Last Filled:      Patient Phone Number: [phone] (home)     Last appt: 9/10/2020   Next appt: Visit date not found    Last OARRS:   RX Monitoring 9/10/2020   Attestation -   Periodic Controlled Substance Monitoring No signs of potential drug abuse or diversion identified.

## 2020-12-15 NOTE — TELEPHONE ENCOUNTER
Medication and Quantity requested: vyvanse 30mg #30    Last Visit  09.10.20    Pharmacy and phone number updated in [facility]:  yes

## 2020-12-16 ENCOUNTER — TELEPHONE (OUTPATIENT)
Dept: FAMILY MEDICINE CLINIC | Age: 55
End: 2020-12-16

## 2020-12-16 ENCOUNTER — NURSE TRIAGE (OUTPATIENT)
Dept: OTHER | Facility: CLINIC | Age: 55
End: 2020-12-16

## 2020-12-16 ENCOUNTER — VIRTUAL VISIT (OUTPATIENT)
Dept: FAMILY MEDICINE CLINIC | Age: 55
End: 2020-12-16
Payer: COMMERCIAL

## 2020-12-16 PROCEDURE — G8427 DOCREV CUR MEDS BY ELIG CLIN: HCPCS | Performed by: NURSE PRACTITIONER

## 2020-12-16 PROCEDURE — 3017F COLORECTAL CA SCREEN DOC REV: CPT | Performed by: NURSE PRACTITIONER

## 2020-12-16 PROCEDURE — 99213 OFFICE O/P EST LOW 20 MIN: CPT | Performed by: NURSE PRACTITIONER

## 2020-12-16 RX ORDER — FLUTICASONE PROPIONATE 50 MCG
2 SPRAY, SUSPENSION (ML) NASAL DAILY
Qty: 1 BOTTLE | Refills: 5 | Status: SHIPPED | OUTPATIENT
Start: 2020-12-16 | End: 2022-03-31 | Stop reason: SDUPTHER

## 2020-12-16 RX ORDER — LORATADINE 10 MG/1
10 TABLET ORAL DAILY
Qty: 90 TABLET | Refills: 1 | Status: SHIPPED | OUTPATIENT
Start: 2020-12-16 | End: 2021-06-14

## 2020-12-16 ASSESSMENT — ENCOUNTER SYMPTOMS
SINUS PRESSURE: 1
WHEEZING: 0
SORE THROAT: 0
SHORTNESS OF BREATH: 0
COUGH: 0
RHINORRHEA: 1

## 2020-12-16 NOTE — TELEPHONE ENCOUNTER
Runny nose that is gone. Needs  Note to go back to work. NO triage done. Reason for Disposition   RN needs further essential information from caller in order to complete triage    Protocols used: INFORMATION ONLY CALL - NO TRIAGE-ADULT-AH      Caller provided care advice and instructed to call back with worsening symptoms. Attention Provider: Thank you for allowing me to participate in the care of your patient. The patient was connected to triage in response to information provided to the E[facility]. Please do not respond through this encounter as the response is not directed to a shared pool. Warm transfer to [place] [facility] ([facility]).

## 2020-12-16 NOTE — TELEPHONE ENCOUNTER
Pt notified, expressed understanding.  Pt also spoke with nurse triage after calling office and advised he symptom was a runny nose, no mention of abdominal pain or earache

## 2020-12-16 NOTE — TELEPHONE ENCOUNTER
Patient received a flu shot [facility]  Monday and been off from work since Monday would like a work note from provider. she states she didn't return to work because she wasn't feeling well.  Please advise

## 2020-12-16 NOTE — PROGRESS NOTES
2020      TELEHEALTH EVALUATION -- Audio/Visual (During OX-49 public health emergency)    HPI:    Powell Opitz (:  1965) has requested an audio/video evaluation for the following concern(s):    Sinusitis  This is a new problem. The current episode started yesterday. The problem is unchanged. There has been no fever. Associated symptoms include congestion and sinus pressure. Pertinent negatives include no chills, coughing, ear pain, headaches, shortness of breath or sore throat. Past treatments include nothing. Review of Systems   Constitutional: Negative for chills, fatigue and fever. HENT: Positive for congestion, postnasal drip, rhinorrhea and sinus pressure. Negative for ear pain and sore throat. Respiratory: Negative for cough, shortness of breath and wheezing. Neurological: Negative for dizziness and headaches. Prior to Visit Medications    Medication Sig Taking? Authorizing Provider   lisdexamfetamine (VYVANSE) 30 MG capsule Take 1 capsule by mouth every morning for 30 days.  Yes Kar Hernandez MD   pantoprazole (PROTONIX) 40 MG tablet Take 1 tablet by mouth every morning (before breakfast) Yes Kar Hernandez MD   fexofenadine [facility]) 180 MG tablet Take 1 tablet by mouth daily Yes Kar Hernandez MD   FLUoxetine (PROZAC) 10 MG capsule Take 1 capsule by mouth daily Yes Kar Hernandez MD   lidocaine (LMX) 4 % cream Apply topically as needed bid Yes Kar Hernandez MD   albuterol sulfate HFA (PROAIR HFA) 108 (90 Base) MCG/ACT inhaler Inhale 2 puffs into the lungs every 4 hours as needed for Wheezing Yes Kar Hernandez MD   Promethazine-Phenylephrine [facility]) 6.25-5 MG/5ML syrup Take 5 mLs by mouth every 6 hours Yes ANDREW Smith - LAUREN   fluticasone [facility]) 50 MCG/ACT nasal spray 2 sprays by Nasal route daily Yes Kar Hernandez MD       Past Medical History:   Diagnosis Date    ADD (attention deficit disorder) 3/4/2016       Past Surgical History: Return if symptoms worsen or fail to improve. William Schwab is a 54 y.o. female being evaluated by a Virtual Visit (video visit) encounter to address concerns as mentioned above. A caregiver was present when appropriate. Due to this being a TeleHealth encounter (During EWDZQ-48 public health emergency), evaluation of the following organ systems was limited: Vitals/Constitutional/EENT/Resp/CV/GI//MS/Neuro/Skin/Heme-Lymph-Imm. Pursuant to the emergency declaration under the [address] [place] [address], [address] [place] [address] authority and the [employer] Act, this Virtual Visit was conducted with patient's (and/or legal guardian's) consent, to reduce the patient's risk of exposure to COVID-19 and provide necessary medical care. The patient (and/or legal guardian) has also been advised to contact this office for worsening conditions or problems, and seek emergency medical treatment and/or call 911 if deemed necessary. Patient identification was verified at the start of the visit: Yes    Total time spent on this encounter: Not billed by time minutes    Services were provided through a video synchronous discussion virtually to substitute for in-person clinic visit. Patient and provider were located at their individual homes. --ANDREW Santos CNP on 12/16/2020 at 2:47 PM    An electronic signature was used to authenticate this note. Monica Manuel

## 2020-12-16 NOTE — TELEPHONE ENCOUNTER
Pt is asking if she can schedule OV today to get note for work. Pt thinks abdominal pain and earache yesterday and today could have been from Flu shot or eating too much chocolate.  I scheduled pt for OV @ 4 pm, but advised due to abdominal pain approval is needed

## 2020-12-16 NOTE — PATIENT INSTRUCTIONS
Patient Education        Allergies: Care Instructions  Your Care Instructions     Allergies occur when your body's defense system (immune system) overreacts to certain substances. The immune system treats a harmless substance as if it were a harmful germ or virus. Many things can make this happen. These include pollens, medicine, food, dust, animal dander, and mold. Allergies can be mild or severe. Mild allergies can be managed with home treatment. But medicine may be needed to prevent problems. Managing your allergies is an important part of staying healthy. Your doctor may suggest that you have allergy testing to help find out what is causing your allergies. Severe allergies can cause reactions that affect your whole body (anaphylactic reactions). Your doctor may prescribe a shot of epinephrine to carry with you in case you have a severe reaction. Learn how to give yourself the shot and keep it with you at all times. Make sure it is not . Follow-up care is a key part of your treatment and safety. Be sure to make and go to all appointments, and call your doctor if you are having problems. It's also a good idea to know your test results and keep a list of the medicines you take. How can you care for yourself at home? · If you have been told by your doctor that dust or dust mites are causing your allergy, decrease the dust around your bed:  ? Wash sheets, pillowcases, and other bedding in hot water every week. ? Use dust-proof covers for pillows, duvets, and mattresses. Avoid plastic covers because they tear easily and do not \"breathe. \" Wash as instructed on the label. ? Do not use any blankets and pillows that you do not need. ? Use blankets that you can wash in your washing machine. ? Consider removing drapes and carpets, which attract and hold dust, from your bedroom. · If you are allergic to house dust and mites, do not use home humidifiers. Your doctor can suggest ways you can control dust and mites. · Look for signs of cockroaches. Cockroaches cause allergic reactions. Use cockroach baits to get rid of them. Then, clean your home well. Cockroaches like areas where grocery bags, newspapers, empty bottles, or cardboard boxes are stored. Do not keep these inside your home, and keep trash and food containers sealed. Seal off any spots where cockroaches might enter your home. · If you are allergic to mold, get rid of furniture, rugs, and drapes that smell musty. Check for mold in the bathroom. · If you are allergic to outdoor pollen or mold spores, use air-conditioning. Change or clean all filters every month. Keep windows closed. · If you are allergic to pollen, stay inside when pollen counts are high. Use a vacuum  with a HEPA filter or a double-thickness filter at least two times each week. · Stay inside when air pollution is bad. Avoid paint fumes, perfumes, and other strong odors. · Avoid conditions that make your allergies worse. Stay away from smoke. Do not smoke or let anyone else smoke in your house. Do not use fireplaces or wood-burning stoves. · If you are allergic to your pets, change the air filter in your furnace every month. Use high-efficiency filters. · If you are allergic to pet dander, keep pets outside or out of your bedroom. Old carpet and cloth furniture can hold a lot of animal dander. You may need to replace them. When should you call for help? Give an epinephrine shot if:    · You think you are having a severe allergic reaction.     · You have symptoms in more than one body area, such as mild nausea and an itchy mouth. After giving an epinephrine shot call 911, even if you feel better. Call 911 if:    · You have symptoms of a severe allergic reaction. These may include:  ? Sudden raised, red areas (hives) all over your body. ? Swelling of the throat, mouth, lips, or tongue. ? Trouble breathing. ? Passing out (losing consciousness). Or you may feel very lightheaded or suddenly feel weak, confused, or restless.     · You have been given an epinephrine shot, even if you feel better. Call your doctor now or seek immediate medical care if:    · You have symptoms of an allergic reaction, such as:  ? A rash or hives (raised, red areas on the skin). ? Itching. ? Swelling. ? Belly pain, nausea, or vomiting. Watch closely for changes in your health, and be sure to contact your doctor if:    · You do not get better as expected. Where can you learn more? Go to https://[employer]. org and sign in to your [employer] account. Enter C042 in the Easy [employer]po box to learn more about \"Allergies: Care Instructions. \"     If you do not have an account, please click on the \"Sign Up Now\" link. Current as of: June 29, 2020               Content Version: 12.6  © 2006-2020 [employer]. Care instructions adapted under license by [facility] ([facility]). If you have questions about a medical condition or this instruction, always ask your healthcare professional. [place] [address] any warranty or liability for your use of this information.

## 2020-12-16 NOTE — LETTER
[address]  Phone: [phone]  Fax: [phone]    ANDREW Lockett CNP        December 16, 2020     Patient: Olga Solomon   YOB: 1965   Date of Visit: 12/16/2020       To Whom it May Concern:    Olga Solomon was seen in my office on 12/16/2020. She may return to work on 12/17/20. She needs to be excused from work on 12/15 & 12/16 due to illness. .    If you have any questions or concerns, please don't hesitate to call.     Sincerely,         ANDREW Lockett CNP

## 2021-01-18 DIAGNOSIS — F98.8 ATTENTION DEFICIT DISORDER, UNSPECIFIED HYPERACTIVITY PRESENCE: ICD-10-CM

## 2021-01-18 NOTE — TELEPHONE ENCOUNTER
----- Message from Jeramie Contreraser sent at 1/18/2021  9:23 AM EST -----  Subject: Refill Request    QUESTIONS  Name of Medication? lisdexamfetamine (VYVANSE) 30 MG capsule  Patient-reported dosage and instructions? 30 mg 1 per day   How many days do you have left? 2  Preferred Pharmacy? [facility]  Pharmacy phone number (if available)? [phone]  Additional Information for Provider? Needs Flonase refilled  ---------------------------------------------------------------------------  --------------  CALL BACK INFO  What is the best way for the office to contact you? OK to leave message on   voicemail  Preferred Call Back Phone Number?  [phone]

## 2021-02-15 DIAGNOSIS — F98.8 ATTENTION DEFICIT DISORDER, UNSPECIFIED HYPERACTIVITY PRESENCE: ICD-10-CM

## 2021-02-15 NOTE — TELEPHONE ENCOUNTER
Medication:   Requested Prescriptions     Pending Prescriptions Disp Refills    lisdexamfetamine (VYVANSE) 30 MG capsule 30 capsule 0     Sig: Take 1 capsule by mouth every morning for 30 days. Last Filled:  1/18/21 #30, 0 RF     Patient Phone Number: [phone] (home)     Last appt: 12/16/2020 sinusitis   Next appt: Visit date not found    Last OARRS:   RX Monitoring 9/10/2020   Attestation -   Periodic Controlled Substance Monitoring No signs of potential drug abuse or diversion identified.

## 2021-02-15 NOTE — TELEPHONE ENCOUNTER
Medication and Quantity requested: lisdexamfetamine (VYVANSE) 30 MG capsule          Last Visit  12/16/20    Pharmacy and phone number updated in [facility]:  Yes [address]

## 2021-03-17 DIAGNOSIS — F98.8 ATTENTION DEFICIT DISORDER, UNSPECIFIED HYPERACTIVITY PRESENCE: ICD-10-CM

## 2021-03-17 NOTE — TELEPHONE ENCOUNTER
----- [facility] sent at 3/17/2021  6:21 PM EDT -----  Subject: Refill Request    QUESTIONS  Name of Medication? lisdexamfetamine (VYVANSE) 30 MG capsule  Patient-reported dosage and instructions? 30 mg take one a day  How many days do you have left? 3  Preferred Pharmacy? [address] [place] [address]  Pharmacy phone number (if available)? [phone]  ---------------------------------------------------------------------------  --------------  Lj MURRY  What is the best way for the office to contact you? OK to leave message on   voicemail  Preferred Call Back Phone Number?  [phone]

## 2021-03-18 NOTE — TELEPHONE ENCOUNTER
Patient had an appointment today that she apparently canceled. She has not been seen since September. She is due for office visit for additional refills.

## 2021-04-20 DIAGNOSIS — F98.8 ATTENTION DEFICIT DISORDER, UNSPECIFIED HYPERACTIVITY PRESENCE: ICD-10-CM

## 2021-04-20 NOTE — TELEPHONE ENCOUNTER
Medication and Quantity requested: vyvanse 30mg #30    Last Visit  12.16.20    Pharmacy and phone number updated in [facility]:  yes

## 2021-05-17 ENCOUNTER — TELEPHONE (OUTPATIENT)
Dept: FAMILY MEDICINE CLINIC | Age: 56
End: 2021-05-17

## 2021-05-17 DIAGNOSIS — F98.8 ATTENTION DEFICIT DISORDER, UNSPECIFIED HYPERACTIVITY PRESENCE: ICD-10-CM

## 2021-05-17 NOTE — TELEPHONE ENCOUNTER
Patient has an appointment tomorrow and her last refill was 4/20. I can refill this medication tomorrow after I see her.

## 2021-05-18 ENCOUNTER — OFFICE VISIT (OUTPATIENT)
Dept: FAMILY MEDICINE CLINIC | Age: 56
End: 2021-05-18
Payer: COMMERCIAL

## 2021-05-18 VITALS
WEIGHT: 153.2 LBS | BODY MASS INDEX: 28.02 KG/M2 | OXYGEN SATURATION: 97 % | HEART RATE: 97 BPM | DIASTOLIC BLOOD PRESSURE: 66 MMHG | SYSTOLIC BLOOD PRESSURE: 130 MMHG

## 2021-05-18 DIAGNOSIS — J06.9 VIRAL URI: ICD-10-CM

## 2021-05-18 DIAGNOSIS — F98.8 ATTENTION DEFICIT DISORDER, UNSPECIFIED HYPERACTIVITY PRESENCE: Primary | ICD-10-CM

## 2021-05-18 DIAGNOSIS — K21.9 GASTROESOPHAGEAL REFLUX DISEASE WITHOUT ESOPHAGITIS: ICD-10-CM

## 2021-05-18 DIAGNOSIS — R20.0 LEFT LEG NUMBNESS: ICD-10-CM

## 2021-05-18 PROCEDURE — 99214 OFFICE O/P EST MOD 30 MIN: CPT | Performed by: FAMILY MEDICINE

## 2021-05-18 PROCEDURE — G8419 CALC BMI OUT NRM PARAM NOF/U: HCPCS | Performed by: FAMILY MEDICINE

## 2021-05-18 PROCEDURE — G8427 DOCREV CUR MEDS BY ELIG CLIN: HCPCS | Performed by: FAMILY MEDICINE

## 2021-05-18 PROCEDURE — 3017F COLORECTAL CA SCREEN DOC REV: CPT | Performed by: FAMILY MEDICINE

## 2021-05-18 PROCEDURE — 1036F TOBACCO NON-USER: CPT | Performed by: FAMILY MEDICINE

## 2021-05-18 RX ORDER — PANTOPRAZOLE SODIUM 40 MG/1
40 TABLET, DELAYED RELEASE ORAL
Qty: 30 TABLET | Refills: 5 | Status: SHIPPED | OUTPATIENT
Start: 2021-05-18 | End: 2022-06-21 | Stop reason: SDUPTHER

## 2021-05-18 SDOH — ECONOMIC STABILITY: FOOD INSECURITY: WITHIN THE PAST 12 MONTHS, THE FOOD YOU BOUGHT JUST DIDN'T LAST AND YOU DIDN'T HAVE MONEY TO GET MORE.: NEVER TRUE

## 2021-05-18 SDOH — ECONOMIC STABILITY: TRANSPORTATION INSECURITY
IN THE PAST 12 MONTHS, HAS THE LACK OF TRANSPORTATION KEPT YOU FROM MEDICAL APPOINTMENTS OR FROM GETTING MEDICATIONS?: NO

## 2021-05-18 SDOH — ECONOMIC STABILITY: HOUSING INSECURITY
IN THE LAST 12 MONTHS, WAS THERE A TIME WHEN YOU DID NOT HAVE A STEADY PLACE TO SLEEP OR SLEPT IN A SHELTER (INCLUDING NOW)?: NO

## 2021-05-18 SDOH — ECONOMIC STABILITY: TRANSPORTATION INSECURITY
IN THE PAST 12 MONTHS, HAS LACK OF TRANSPORTATION KEPT YOU FROM MEETINGS, WORK, OR FROM GETTING THINGS NEEDED FOR DAILY LIVING?: NO

## 2021-05-18 SDOH — ECONOMIC STABILITY: INCOME INSECURITY: IN THE LAST 12 MONTHS, WAS THERE A TIME WHEN YOU WERE NOT ABLE TO PAY THE MORTGAGE OR RENT ON TIME?: NO

## 2021-05-18 ASSESSMENT — PATIENT HEALTH QUESTIONNAIRE - PHQ9
SUM OF ALL RESPONSES TO PHQ QUESTIONS 1-9: 0
SUM OF ALL RESPONSES TO PHQ QUESTIONS 1-9: 0

## 2021-05-18 ASSESSMENT — SOCIAL DETERMINANTS OF HEALTH (SDOH): HOW HARD IS IT FOR YOU TO PAY FOR THE VERY BASICS LIKE FOOD, HOUSING, MEDICAL CARE, AND HEATING?: NOT HARD AT ALL

## 2021-05-18 ASSESSMENT — ENCOUNTER SYMPTOMS
RHINORRHEA: 1
COUGH: 1

## 2021-05-18 NOTE — PROGRESS NOTES
Jack Gunter (:  1965) is a 54 y.o. female,Established patient, here for evaluation of the following chief complaint(s):  Medication Check, Cough, Nasal Congestion, Numbness (L leg. believes it's from sciatic nerve on her L), and Injury (R adan broke it, swelling)         ASSESSMENT/PLAN:  1. Attention deficit disorder, unspecified hyperactivity presence  -     lisdexamfetamine (VYVANSE) 30 MG capsule; Take 1 capsule by mouth every morning for 30 days. , Disp-30 capsule, R-0Normal  Patient states she is still not working and in fact has 48 weeks of unemployment total and will plan on going back when her unemployment runs out. She states she still takes the Vyvanse however to focus through the day. 2. Gastroesophageal reflux disease without esophagitis  Patient states symptoms are controlled with the pantoprazole. -     pantoprazole (PROTONIX) 40 MG tablet; Take 1 tablet by mouth every morning (before breakfast), Disp-30 tablet, R-5Normal  3. Viral URI  Symptomatic treatment   Return if not better     4. Left leg numbness  Discussed possibility of doing an EMG/nerve conduction and/or MRI of LS-spine. She will defer at present and return if symptoms worsen. Return in about 6 months (around 2021), or if symptoms worsen or fail to improve, for OARRS. Subjective   SUBJECTIVE/OBJECTIVE:  HPI  ADD:stable,and continues to need her Vyvanse despite not working. 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  GERD:states she needs meds refilled, she states with the medication she does not really have any symptoms. Left leg numbness: Patient states periodically she notes some numbness in her left leg that seems to radiate from her sciatic. This comes and goes. This is not caused any general change in her activity or any disability. URI: Patient states she has had a runny nose and slight cough. She states this might be allergies. She does not have any fever, chills, diaphoresis. She states she did receive her Covid vaccine. Review of Systems   Constitutional: Negative. Negative for activity change, appetite change, chills, diaphoresis, fatigue and fever. HENT: Positive for congestion and rhinorrhea. Respiratory: Positive for cough. Cardiovascular: Negative. Gastrointestinal:        GERD   Genitourinary: Negative. Musculoskeletal: Negative. Skin: Negative for rash. Neurological: Positive for numbness (L leg off and on. Had sciatica earlier). Psychiatric/Behavioral:        ADD          Objective   Physical Exam  Constitutional:       General: She is not in acute distress. Appearance: She is well-developed. HENT:      Head: Normocephalic and atraumatic. Right Ear: Tympanic membrane, ear canal and external ear normal.      Left Ear: Tympanic membrane, ear canal and external ear normal.      Mouth/Throat:      Pharynx: No oropharyngeal exudate. Neck:      Thyroid: No thyromegaly. Vascular: No carotid bruit. Cardiovascular:      Rate and Rhythm: Normal rate and regular rhythm. Pulmonary:      Effort: Pulmonary effort is normal.      Breath sounds: Normal breath sounds. No wheezing or rales. Musculoskeletal:         General: No swelling, tenderness or deformity. Cervical back: Neck supple. Right lower leg: No edema. Left lower leg: No edema. Comments: Patient had good strength and equal in both legs   Lymphadenopathy:      Cervical: No cervical adenopathy. Skin:     General: Skin is warm and dry. Neurological:      Mental Status: She is alert and oriented to person, place, and time. Motor: No weakness. Coordination: Coordination normal.      Gait: Gait normal.      Deep Tendon Reflexes: Reflexes normal.   Psychiatric:         Behavior: Behavior normal.         Thought Content: Thought content normal.         Judgment: Judgment normal.                  An electronic signature was used to authenticate this note. --Oral Oconnor MD

## 2021-06-01 ENCOUNTER — TELEPHONE (OUTPATIENT)
Dept: FAMILY MEDICINE CLINIC | Age: 56
End: 2021-06-01

## 2021-06-01 DIAGNOSIS — M54.32 LEFT SCIATIC NERVE PAIN: Primary | ICD-10-CM

## 2021-06-01 NOTE — TELEPHONE ENCOUNTER
Referral to chiropractor was printed and given to [facility]   Does she have 1 she is going to or does she need a referral to a chiropractor. If she needs referral, below is a chiropractor I use.     Allison Charles  [phone]

## 2021-06-01 NOTE — TELEPHONE ENCOUNTER
----- Message from Lidia Aguilar sent at 6/1/2021 12:24 PM EDT -----  Subject: Referral Request    QUESTIONS   Reason for referral request? Patient needs a referral for chiropractor due   says provider is aware of numbness in back left leg, possibly sciatic   nerve   Has the physician seen you for this condition before? Yes  Select a date? 2021-05-17  Select the physician (PCP or Specialist)? Marques Nava   Preferred Specialist (if applicable)? Do you already have an appointment scheduled? No  Additional Information for Provider?   ---------------------------------------------------------------------------  --------------  CALL BACK INFO  What is the best way for the office to contact you? Do not leave any   message, patient will call back for answer  Preferred Call Back Phone Number?  [phone]

## 2021-06-22 ENCOUNTER — TELEPHONE (OUTPATIENT)
Dept: FAMILY MEDICINE CLINIC | Age: 56
End: 2021-06-22

## 2021-06-22 DIAGNOSIS — F98.8 ATTENTION DEFICIT DISORDER, UNSPECIFIED HYPERACTIVITY PRESENCE: ICD-10-CM

## 2021-06-22 NOTE — TELEPHONE ENCOUNTER
Medication:   Requested Prescriptions     Pending Prescriptions Disp Refills    lisdexamfetamine (VYVANSE) 30 MG capsule 30 capsule 0     Sig: Take 1 capsule by mouth every morning for 30 days. Last Filled:  05/18/2021 #30 0rf    Patient Phone Number: [phone] (home)     Last appt: 5/18/2021   Next appt: Visit date not found    Last OARRS:   RX Monitoring 5/18/2021   Attestation -   Periodic Controlled Substance Monitoring No signs of potential drug abuse or diversion identified.

## 2021-07-19 DIAGNOSIS — F98.8 ATTENTION DEFICIT DISORDER, UNSPECIFIED HYPERACTIVITY PRESENCE: ICD-10-CM

## 2021-07-19 NOTE — TELEPHONE ENCOUNTER
----- Message from Kelin Daniels sent at 7/17/2021 11:04 AM EDT -----  Subject: Refill Request    QUESTIONS  Name of Medication? lisdexamfetamine (VYVANSE) 30 MG capsule  Patient-reported dosage and instructions? 30 mg  How many days do you have left? 0  Preferred Pharmacy? [address] [place] [address]  Pharmacy phone number (if available)? [phone]  ---------------------------------------------------------------------------  --------------  Tamra Boom INFO  What is the best way for the office to contact you? OK to leave message on   voicemail  Preferred Call Back Phone Number?  [phone]

## 2021-08-20 DIAGNOSIS — F98.8 ATTENTION DEFICIT DISORDER, UNSPECIFIED HYPERACTIVITY PRESENCE: ICD-10-CM

## 2021-08-20 NOTE — TELEPHONE ENCOUNTER
Medication:   Requested Prescriptions     Pending Prescriptions Disp Refills    lisdexamfetamine (VYVANSE) 30 MG capsule 30 capsule 0     Sig: Take 1 capsule by mouth every morning for 30 days. Last Filled:  07/19/21    Patient Phone Number: [phone] (home)     Last appt: 5/18/2021   Next appt: Visit date not found    Last OARRS:   RX Monitoring 5/18/2021   Attestation -   Periodic Controlled Substance Monitoring No signs of potential drug abuse or diversion identified.

## 2021-09-13 ENCOUNTER — OFFICE VISIT (OUTPATIENT)
Dept: FAMILY MEDICINE CLINIC | Age: 56
End: 2021-09-13
Payer: COMMERCIAL

## 2021-09-13 VITALS
BODY MASS INDEX: 25.18 KG/M2 | HEART RATE: 92 BPM | OXYGEN SATURATION: 96 % | DIASTOLIC BLOOD PRESSURE: 86 MMHG | WEIGHT: 147.5 LBS | HEIGHT: 64 IN | SYSTOLIC BLOOD PRESSURE: 144 MMHG

## 2021-09-13 DIAGNOSIS — L98.9 BENIGN SKIN LESION OF NOSE: Primary | ICD-10-CM

## 2021-09-13 DIAGNOSIS — F98.8 ATTENTION DEFICIT DISORDER, UNSPECIFIED HYPERACTIVITY PRESENCE: ICD-10-CM

## 2021-09-13 DIAGNOSIS — Z12.31 ENCOUNTER FOR SCREENING MAMMOGRAM FOR MALIGNANT NEOPLASM OF BREAST: ICD-10-CM

## 2021-09-13 DIAGNOSIS — Z00.00 WELL ADULT EXAM: ICD-10-CM

## 2021-09-13 PROCEDURE — 3017F COLORECTAL CA SCREEN DOC REV: CPT | Performed by: FAMILY MEDICINE

## 2021-09-13 PROCEDURE — 99213 OFFICE O/P EST LOW 20 MIN: CPT | Performed by: FAMILY MEDICINE

## 2021-09-13 PROCEDURE — 1036F TOBACCO NON-USER: CPT | Performed by: FAMILY MEDICINE

## 2021-09-13 PROCEDURE — G8419 CALC BMI OUT NRM PARAM NOF/U: HCPCS | Performed by: FAMILY MEDICINE

## 2021-09-13 PROCEDURE — G8427 DOCREV CUR MEDS BY ELIG CLIN: HCPCS | Performed by: FAMILY MEDICINE

## 2021-09-13 ASSESSMENT — ENCOUNTER SYMPTOMS
RESPIRATORY NEGATIVE: 1
GASTROINTESTINAL NEGATIVE: 1

## 2021-09-13 NOTE — PROGRESS NOTES
Kimberley Chase (:  1965) is a 54 y.o. female,Established patient, here for evaluation of the following chief complaint(s):  Skin Problem         ASSESSMENT/PLAN:  1. Benign skin lesion of nose  -     [facility], , Otolaryngology, [facility]  2. Attention deficit disorder, unspecified hyperactivity presence  OARRS report reviewed and no inconsistencies noted   The patient understands the risks of dependency/addiction with Vyvanse and will take as little as possible and discontinue as soon as possible     3. Well adult exam  -     CBC Auto Differential; Future  -     Comprehensive Metabolic Panel; Future  -     Lipid Panel; Future  -     TSH with Reflex; Future  -     Hemoglobin A1C; Future  -     Hepatitis C Antibody; Future  -     HIV Screen; Future  4. Encounter for screening mammogram for malignant neoplasm of breast  -     AMADA DIGITAL SCREENING AUGMENTED BILATERAL; Future      Return if symptoms worsen or fail to improve. Subjective   SUBJECTIVE/OBJECTIVE:  HPI  States she has had a bump on her nos for the last 3 years and states it is growing. No significant symptoms related to this . ADD: vyvanse still helps but wears off after 8 hrs. Review of Systems   Constitutional: Negative. HENT:        Per HPI   Respiratory: Negative. Cardiovascular: Negative. Gastrointestinal: Negative. Endocrine: Negative. Genitourinary: Negative. Musculoskeletal: Negative. Neurological: Negative. Objective   Physical Exam  Constitutional:       General: She is not in acute distress. Appearance: She is not ill-appearing, toxic-appearing or diaphoretic. HENT:      Head:     Eyes:      Conjunctiva/sclera: Conjunctivae normal.   Skin:     General: Skin is warm and dry. Neurological:      Mental Status: She is alert and oriented to person, place, and time. Psychiatric:         Behavior: Behavior normal.         Thought Content:  Thought content normal. Judgment: Judgment normal.                  An electronic signature was used to authenticate this note.     --Xiomara Strauss MD

## 2021-09-21 ENCOUNTER — TELEPHONE (OUTPATIENT)
Dept: FAMILY MEDICINE CLINIC | Age: 56
End: 2021-09-21

## 2021-09-21 DIAGNOSIS — F98.8 ATTENTION DEFICIT DISORDER, UNSPECIFIED HYPERACTIVITY PRESENCE: ICD-10-CM

## 2021-09-21 NOTE — TELEPHONE ENCOUNTER
Medication:   Requested Prescriptions     Pending Prescriptions Disp Refills    lisdexamfetamine (VYVANSE) 30 MG capsule 30 capsule 0     Sig: Take 1 capsule by mouth every morning for 30 days. Last Filled:  08/20/2021 #30 0rf    Patient Phone Number: [phone] (home)     Last appt: 9/13/2021   Next appt: Visit date not found    Last OARRS:   RX Monitoring 9/13/2021   Attestation -   Periodic Controlled Substance Monitoring No signs of potential drug abuse or diversion identified.

## 2021-09-21 NOTE — TELEPHONE ENCOUNTER
----- Message from [facility] sent at 9/21/2021  3:09 PM EDT -----  Subject: Refill Request    QUESTIONS  Name of Medication? lisdexamfetamine (VYVANSE) 30 MG capsule  Patient-reported dosage and instructions? 35 mg once a day  How many days do you have left? 1  [id number]  Pharmacy phone number (if available)? [phone]  ---------------------------------------------------------------------------  --------------  Deannie Sandhoff INFO  What is the best way for the office to contact you? Do not leave any   message, patient will call back for answer  Preferred Call Back Phone Number?  [phone]

## 2021-10-14 ENCOUNTER — TELEPHONE (OUTPATIENT)
Dept: FAMILY MEDICINE CLINIC | Age: 56
End: 2021-10-14

## 2021-10-15 ENCOUNTER — TELEPHONE (OUTPATIENT)
Dept: FAMILY MEDICINE CLINIC | Age: 56
End: 2021-10-15

## 2021-10-15 NOTE — TELEPHONE ENCOUNTER
----- Message from Ethan Cindygisele sent at 10/14/2021  5:11 PM EDT -----  Subject: Message to Provider    QUESTIONS  Information for Provider? Patient was wanting to know if her prescription   was called in and if not can it be sent over to the pharmacy on file. She   asked that someone give her a call  ---------------------------------------------------------------------------  --------------  CALL BACK INFO  What is the best way for the office to contact you? OK to leave message on   voicemail  Preferred Call Back Phone Number?  [phone]  ---------------------------------------------------------------------------  --------------  SCRIPT ANSWERS  undefined

## 2021-10-21 ENCOUNTER — VIRTUAL VISIT (OUTPATIENT)
Dept: FAMILY MEDICINE CLINIC | Age: 56
End: 2021-10-21
Payer: COMMERCIAL

## 2021-10-21 DIAGNOSIS — F98.8 ATTENTION DEFICIT DISORDER, UNSPECIFIED HYPERACTIVITY PRESENCE: ICD-10-CM

## 2021-10-21 DIAGNOSIS — Z12.11 SCREENING FOR COLON CANCER: Primary | ICD-10-CM

## 2021-10-21 DIAGNOSIS — R11.0 NAUSEA: ICD-10-CM

## 2021-10-21 PROCEDURE — G8484 FLU IMMUNIZE NO ADMIN: HCPCS | Performed by: NURSE PRACTITIONER

## 2021-10-21 PROCEDURE — 99213 OFFICE O/P EST LOW 20 MIN: CPT | Performed by: NURSE PRACTITIONER

## 2021-10-21 PROCEDURE — G8419 CALC BMI OUT NRM PARAM NOF/U: HCPCS | Performed by: NURSE PRACTITIONER

## 2021-10-21 PROCEDURE — G8427 DOCREV CUR MEDS BY ELIG CLIN: HCPCS | Performed by: NURSE PRACTITIONER

## 2021-10-21 PROCEDURE — 4004F PT TOBACCO SCREEN RCVD TLK: CPT | Performed by: NURSE PRACTITIONER

## 2021-10-21 PROCEDURE — 3017F COLORECTAL CA SCREEN DOC REV: CPT | Performed by: NURSE PRACTITIONER

## 2021-10-21 RX ORDER — ONDANSETRON 4 MG/1
4 TABLET, ORALLY DISINTEGRATING ORAL 3 TIMES DAILY PRN
Qty: 21 TABLET | Refills: 0 | Status: SHIPPED | OUTPATIENT
Start: 2021-10-21

## 2021-10-21 ASSESSMENT — ENCOUNTER SYMPTOMS
SORE THROAT: 0
COUGH: 1
NAUSEA: 1
WHEEZING: 0
CHEST TIGHTNESS: 0

## 2021-10-21 NOTE — PROGRESS NOTES
10/21/2021    TELEHEALTH EVALUATION -- Audio/Visual (During OHVRA-25 public health emergency)    Jose Manuel Cook (:  1965) has requested an audio/video evaluation for the following concern(s):    Cough, nausea and nasal/chest congestion for one day. Denies shortness of breath, no loss of taste or smell. White sputum. Review of Systems   Constitutional: Negative. Negative for fever. HENT: Positive for congestion. Negative for ear pain and sore throat. Respiratory: Positive for cough. Negative for chest tightness and wheezing. Cardiovascular: Negative. Negative for chest pain and palpitations. Gastrointestinal: Positive for nausea. Genitourinary: Negative. Neurological: Negative. Prior to Visit Medications    Medication Sig Taking? Authorizing Provider   ondansetron (ZOFRAN-ODT) 4 MG disintegrating tablet Take 1 tablet by mouth 3 times daily as needed for Nausea or Vomiting Yes ANDREW Vega CNP   lisdexamfetamine (VYVANSE) 30 MG capsule Take 1 capsule by mouth every morning for 30 days.  Yes Sona Prasad MD   pantoprazole (PROTONIX) 40 MG tablet Take 1 tablet by mouth every morning (before breakfast) Yes Sona Prasad MD   fluticasone [facility]) 50 MCG/ACT nasal spray 2 sprays by Each Nostril route daily Yes Saintclair Leeks, APRN - CNP   FLUoxetine (PROZAC) 10 MG capsule Take 1 capsule by mouth daily Yes Sona Prasad MD   lidocaine (LMX) 4 % cream Apply topically as needed bid Yes Sona Prasad MD   albuterol sulfate HFA (PROAIR HFA) 108 (90 Base) MCG/ACT inhaler Inhale 2 puffs into the lungs every 4 hours as needed for Wheezing Yes Sona Prasad MD   Promethazine-Phenylephrine [facility]) 6.25-5 MG/5ML syrup Take 5 mLs by mouth every 6 hours Yes Saintclair Leeks, APRN - CNP     Past Medical History:   Diagnosis Date    ADD (attention deficit disorder) 3/4/2016     Past Surgical History:   Procedure Laterality Date    BREAST SURGERY  2009    implants     Family History Problem Relation Age of Onset    Diabetes Father      Allergies   Allergen Reactions    Pcn [Penicillins] Other (See Comments)     Social History     Tobacco Use    Smoking status: Current Every Day Smoker     Packs/day: 0.25     Years: 10.00     Pack years: 2.50     Types: Cigarettes    Smokeless tobacco: Never Used   Vaping Use    Vaping Use: Never used   Substance Use Topics    Alcohol use: Yes     Alcohol/week: 0.0 standard drinks     Comment: occ    Drug use: Yes     Types: Marijuana        PHYSICAL EXAMINATION:  Vital Signs: (As obtained by patient/caregiver or practitioner observation)  There were no vitals taken for this visit. Respiratory rate appears normal  Constitutional: Appears well-developed and well-nourished. No apparent distress    Mental status: Alert and awake. Oriented to person/place/larissa. Able to follow commands    Eyes: EOM normal. Sclera normal. No discharge visible  HENT: Normocephalic, atraumatic. Mouth/Throat: Mucous membranes are moist. External Ears Normal    Neck: No visualized mass   Pulmonary/Chest: Respiratory effort normal.  No visualized signs of difficulty breathing or respiratory distress        Musculoskeletal:  Normal range of motion of neck  Neurological: No Facial Asymmetry (Cranial nerve 7 motor function) (limited exam to video visit). No gaze palsy       Skin:  No significant exanthematous lesions or discoloration noted on facial skin       Psychiatric: Normal Affect. No Hallucinations          ASSESSMENT/PLAN:  Push fluids  Rest  - loratadine (CLARITIN) 10 MG tablet; Take 1 tablet by mouth daily  Dispense: 90 tablet; Refill: 1  - fluticasone (FLONASE) 50 MCG/ACT nasal spray; 2 sprays by Each Nostril route daily  Dispense: 1 Bottle; Refill: 5  Symtomatic treatment: Tylenol, rest, salt water gargles, increase fluids. Return if symptoms worsen or fail to improve.     Howard Echeverria is a 64 y.o. female being evaluated by a Virtual Visit (video visit) encounter to address concerns as mentioned above. A caregiver was present when appropriate. Due to this being a TeleHealth encounter (During CUFDE-91 public health emergency), evaluation of the following organ systems was limited: Vitals/Constitutional/EENT/Resp/CV/GI//MS/Neuro/Skin/Heme-Lymph-Imm. Pursuant to the emergency declaration under the [address] authority and the [employer] Act, this Virtual Visit was conducted with patient's (and/or legal guardian's) consent, to reduce the patient's risk of exposure to COVID-19 and provide necessary medical care. The patient (and/or legal guardian) has also been advised to contact this office for worsening conditions or problems, and seek emergency medical treatment and/or call 911 if deemed necessary. Patient identification was verified at the start of the visit: Yes    Total time spent on this encounter: 20 minutes    Services were provided through a video synchronous discussion virtually to substitute for in-person clinic visit. Patient and provider were located at their individual homes. --ANDREW Barth CNP on 10/21/2021 at 1:39 PM    An electronic signature was used to authenticate this note. July Perez

## 2021-10-21 NOTE — TELEPHONE ENCOUNTER
Medication:   Requested Prescriptions     Pending Prescriptions Disp Refills    lisdexamfetamine (VYVANSE) 30 MG capsule 30 capsule 0     Sig: Take 1 capsule by mouth every morning for 30 days. Last Filled:  9/21/2021, 30, 0    Patient Phone Number: [phone] (home)     Last appt: 10/21/2021   Next appt: Visit date not found    Last OARRS:   RX Monitoring 9/13/2021   Attestation -   Periodic Controlled Substance Monitoring No signs of potential drug abuse or diversion identified.

## 2021-10-27 ENCOUNTER — TELEPHONE (OUTPATIENT)
Dept: FAMILY MEDICINE CLINIC | Age: 56
End: 2021-10-27

## 2021-10-27 NOTE — LETTER
[address]  Phone: [phone]  Fax: [phone]    PINKY Rogers        October 27, 2021     Patient: Colonel Rendon   YOB: 1965   Date of Visit: 10/21/2021       To Whom it May Concern:    Colonel Rendon was seen in my clinic on 10/21/2021. She may return to work on 10/28/2021. If you have any questions or concerns, please don't hesitate to call.     Sincerely,         PINKY Rogers

## 2021-10-27 NOTE — TELEPHONE ENCOUNTER
I have a letter made and saved in the computer just needs to be printed and signed by Lam Ham if she feels content to write the note for patient.   Please advise

## 2021-10-27 NOTE — TELEPHONE ENCOUNTER
I presume it is okay however David Levin was the one that saw her and you need to run it by her. I do not know what the content of her complain or her office visit was.

## 2021-10-28 ENCOUNTER — TELEPHONE (OUTPATIENT)
Dept: FAMILY MEDICINE CLINIC | Age: 56
End: 2021-10-28

## 2021-10-28 NOTE — TELEPHONE ENCOUNTER
----- Message from Roy Siemens sent at 10/28/2021  9:01 AM EDT -----  Subject: Message to Provider    QUESTIONS  Information for Provider? Patient is requesting a call back regarding her   recent illness. She has not recovered and needs to have a doctor's note to   return to work.  ---------------------------------------------------------------------------  --------------  [address]  What is the best way for the office to contact you? Do not leave any   message, patient will call back for answer  Preferred Call Back Phone Number? [phone]  ---------------------------------------------------------------------------  --------------  SCRIPT ANSWERS  Relationship to Patient?  Self

## 2021-10-28 NOTE — TELEPHONE ENCOUNTER
The patient calls in and states she does not know what to do. The patient states she is so sick, and she needs to stay home and rest.    The patient states that her son has the same illness, and also tested COVID Negative. The patient states she will go back to work tomorrow. The patient states she has been taking Tylenol, but it is not helping. The patient had a virtual visit on 10/21/21 with [facility]. The patient is still experiencing coughing, and nausea. Please Advise.

## 2021-10-28 NOTE — TELEPHONE ENCOUNTER
The note was written and signed. Please follow up to make sure communication went back to the patient.

## 2021-10-28 NOTE — TELEPHONE ENCOUNTER
----- Message from Lina Obrien sent at 10/28/2021  9:01 AM EDT -----  Subject: Message to Provider    QUESTIONS  Information for Provider? Patient is requesting a call back regarding her   recent illness. She has not recovered and needs to have a doctor's note to   return to work.  ---------------------------------------------------------------------------  --------------  [address]  What is the best way for the office to contact you? Do not leave any   message, patient will call back for answer  Preferred Call Back Phone Number? [phone]  ---------------------------------------------------------------------------  --------------  SCRIPT ANSWERS  Relationship to Patient?  Self

## 2021-11-04 ENCOUNTER — TELEPHONE (OUTPATIENT)
Dept: FAMILY MEDICINE CLINIC | Age: 56
End: 2021-11-04

## 2021-11-05 ENCOUNTER — TELEPHONE (OUTPATIENT)
Dept: FAMILY MEDICINE CLINIC | Age: 56
End: 2021-11-05

## 2021-11-05 NOTE — TELEPHONE ENCOUNTER
----- Message from Charlene Flaherty sent at 11/5/2021 12:57 PM EDT -----  Subject: Message to Provider    QUESTIONS  Information for Provider? Patient reports that her illness has progressed   to her chest and is requesting medicine for cough. Please reach out to   advise.  ---------------------------------------------------------------------------  --------------  CALL BACK INFO  What is the best way for the office to contact you? OK to leave message on   voicemail  Preferred Call Back Phone Number? [phone]  ---------------------------------------------------------------------------  --------------  SCRIPT ANSWERS  Relationship to Patient?  Self

## 2021-11-08 NOTE — TELEPHONE ENCOUNTER
Pt came in office spoke with Marcie she said she is still sick and wanted an extended day on her letter. Per dr. Carmina Rankin he okayed it for tomorrow but also told to check with Anjelica Singer on what she wants to do.

## 2021-11-09 ENCOUNTER — OFFICE VISIT (OUTPATIENT)
Dept: FAMILY MEDICINE CLINIC | Age: 56
End: 2021-11-09
Payer: COMMERCIAL

## 2021-11-09 ENCOUNTER — TELEPHONE (OUTPATIENT)
Dept: FAMILY MEDICINE CLINIC | Age: 56
End: 2021-11-09

## 2021-11-09 VITALS
TEMPERATURE: 97.9 F | HEIGHT: 63 IN | WEIGHT: 152.8 LBS | HEART RATE: 91 BPM | OXYGEN SATURATION: 97 % | SYSTOLIC BLOOD PRESSURE: 110 MMHG | DIASTOLIC BLOOD PRESSURE: 60 MMHG | BODY MASS INDEX: 27.07 KG/M2

## 2021-11-09 DIAGNOSIS — J06.9 PROTRACTED URI: Primary | ICD-10-CM

## 2021-11-09 DIAGNOSIS — Z12.11 SCREENING FOR COLON CANCER: ICD-10-CM

## 2021-11-09 PROCEDURE — G8419 CALC BMI OUT NRM PARAM NOF/U: HCPCS | Performed by: NURSE PRACTITIONER

## 2021-11-09 PROCEDURE — 99213 OFFICE O/P EST LOW 20 MIN: CPT | Performed by: NURSE PRACTITIONER

## 2021-11-09 PROCEDURE — 3017F COLORECTAL CA SCREEN DOC REV: CPT | Performed by: NURSE PRACTITIONER

## 2021-11-09 PROCEDURE — G8427 DOCREV CUR MEDS BY ELIG CLIN: HCPCS | Performed by: NURSE PRACTITIONER

## 2021-11-09 PROCEDURE — 4004F PT TOBACCO SCREEN RCVD TLK: CPT | Performed by: NURSE PRACTITIONER

## 2021-11-09 PROCEDURE — G8484 FLU IMMUNIZE NO ADMIN: HCPCS | Performed by: NURSE PRACTITIONER

## 2021-11-09 RX ORDER — AZITHROMYCIN 250 MG/1
250 TABLET, FILM COATED ORAL SEE ADMIN INSTRUCTIONS
Qty: 6 TABLET | Refills: 0 | Status: SHIPPED | OUTPATIENT
Start: 2021-11-09 | End: 2021-11-14

## 2021-11-09 RX ORDER — ONDANSETRON 4 MG/1
4 TABLET, ORALLY DISINTEGRATING ORAL 3 TIMES DAILY PRN
Qty: 21 TABLET | Refills: 0 | Status: SHIPPED | OUTPATIENT
Start: 2021-11-09 | End: 2021-12-13

## 2021-11-09 RX ORDER — PREDNISONE 20 MG/1
40 TABLET ORAL DAILY
Qty: 10 TABLET | Refills: 0 | Status: SHIPPED | OUTPATIENT
Start: 2021-11-09 | End: 2021-11-14

## 2021-11-09 ASSESSMENT — ENCOUNTER SYMPTOMS
RHINORRHEA: 1
SHORTNESS OF BREATH: 0
COUGH: 1
CHEST TIGHTNESS: 0
SINUS PAIN: 1
GASTROINTESTINAL NEGATIVE: 1
SORE THROAT: 0
SINUS PRESSURE: 1
WHEEZING: 0

## 2021-11-09 NOTE — LETTER
[address]  Phone: [phone]  Fax: [phone]    Homero [address] -CNP        November 9, 2021     Patient: Terry Cormier   YOB: 1965   Date of Visit: 11/9/2021       To Whom It May Concern: It is my medical opinion that Terry Cormier may return to work on 11/11/21. If you have any questions or concerns, please don't hesitate to call. Sincerely,        Lilian Harrison APRN-CNP

## 2021-11-09 NOTE — PROGRESS NOTES
2021     Jt Hoffmann (:  1965) is a 64 y.o. female, here for evaluation of the following medical concerns:    Chief Complaint   Patient presents with    Nausea     x3 weeks    Nasal Congestion     x3 weeks    Cough     x3 weeks     Patient states she continues with cough has slightly improved,  nasal congestion and nausea is worsening. Feels at night all of her symptoms are worse. denies fever. Denies shortness of breath, dizziness, headache. Had a negative covid test.      Review of Systems   Constitutional: Negative. Negative for chills, diaphoresis, fatigue and fever. HENT: Positive for congestion, rhinorrhea, sinus pressure and sinus pain. Negative for ear discharge, ear pain and sore throat. Respiratory: Positive for cough. Negative for chest tightness, shortness of breath and wheezing. Cardiovascular: Negative. Gastrointestinal: Negative. Genitourinary: Negative. Neurological: Negative. Prior to Visit Medications    Medication Sig Taking? Authorizing Provider   azithromycin (ZITHROMAX) 250 MG tablet Take 1 tablet by mouth See Admin Instructions for 5 days 500mg on day 1 followed by 250mg on days 2 - 5 Yes ANDREW Hein CNP   predniSONE (DELTASONE) 20 MG tablet Take 2 tablets by mouth daily for 5 days Yes ANDREW Hein CNP   ondansetron (ZOFRAN-ODT) 4 MG disintegrating tablet Take 1 tablet by mouth 3 times daily as needed for Nausea or Vomiting Yes ANDREW Hein CNP   lisdexamfetamine (VYVANSE) 30 MG capsule Take 1 capsule by mouth every morning for 30 days.  Yes Claire Moore MD   pantoprazole (PROTONIX) 40 MG tablet Take 1 tablet by mouth every morning (before breakfast) Yes Claire Moore MD   fluticasone [facility]) 50 MCG/ACT nasal spray 2 sprays by Each Nostril route daily Yes ANDREW Casey CNP   FLUoxetine (PROZAC) 10 MG capsule Take 1 capsule by mouth daily Yes Claire Moore MD   lidocaine (LMX) 4 % cream Apply topically as needed bid Yes Nico Hernandez MD   albuterol sulfate HFA (PROAIR HFA) 108 (90 Base) MCG/ACT inhaler Inhale 2 puffs into the lungs every 4 hours as needed for Wheezing Yes Nico Hernandez MD   Promethazine-Phenylephrine [facility]) 6.25-5 MG/5ML syrup Take 5 mLs by mouth every 6 hours Yes Fidel Gracia APRN - CNP        Social History     Tobacco Use    Smoking status: Current Every Day Smoker     Packs/day: 0.25     Years: 10.00     Pack years: 2.50     Types: Cigarettes    Smokeless tobacco: Never Used   Vaping Use    Vaping Use: Never used   Substance Use Topics    Alcohol use: Yes     Alcohol/week: 0.0 standard drinks     Comment: occ    Drug use: Yes     Types: Marijuana (Weed)        Vitals:    11/09/21 1422   BP: 110/60   Pulse: 91   Temp: 97.9 °F (36.6 °C)   SpO2: 97%   Weight: 152 lb 12.8 oz (69.3 kg)   Height: 5' 3\" (1.6 m)     Estimated body mass index is 27.07 kg/m² as calculated from the following:    Height as of this encounter: 5' 3\" (1.6 m). Weight as of this encounter: 152 lb 12.8 oz (69.3 kg). Physical Exam  Vitals and nursing note reviewed. Constitutional:       General: She is not in acute distress. Appearance: Normal appearance. She is normal weight. She is not ill-appearing. Cardiovascular:      Rate and Rhythm: Normal rate and regular rhythm. Heart sounds: Normal heart sounds, S1 normal and S2 normal. No murmur heard. Pulmonary:      Effort: Pulmonary effort is normal.      Breath sounds: Normal air entry. Decreased breath sounds present. Abdominal:      General: Abdomen is flat. Bowel sounds are normal.      Palpations: Abdomen is soft. Skin:     General: Skin is warm and dry. Capillary Refill: Capillary refill takes less than 2 seconds. Neurological:      General: No focal deficit present. Mental Status: She is alert. Psychiatric:         Mood and Affect: Mood normal.         ASSESSMENT/PLAN:  1.  Protracted URI  Push fluids  STOP smoking  Tylenol/Motrin PRN pain/body aches  - azithromycin (ZITHROMAX) 250 MG tablet; Take 1 tablet by mouth See Admin Instructions for 5 days 500mg on day 1 followed by 250mg on days 2 - 5  Dispense: 6 tablet; Refill: 0  - predniSONE (DELTASONE) 20 MG tablet; Take 2 tablets by mouth daily for 5 days  Dispense: 10 tablet; Refill: 0  - ondansetron (ZOFRAN-ODT) 4 MG disintegrating tablet; Take 1 tablet by mouth 3 times daily as needed for Nausea or Vomiting  Dispense: 21 tablet; Refill: 0    2. Screening for colon cancer  Send in cologuard  - Colua (For External Results Only); Future    Return if symptoms worsen or fail to improve.

## 2021-11-09 NOTE — PATIENT INSTRUCTIONS

## 2021-11-09 NOTE — LETTER
[address]  Phone: [phone]  Fax: [phone]    ANDREW Torres CNP        November 9, 2021     Patient: Chilo Major   YOB: 1965   Date of Visit: 11/9/2021       To Whom It May Concern: It is my medical opinion that Chilo Major may return to full duty immediately with no restrictions on 11/11/2021. Please excuse her from 11/5/2021 - 11/11/2021. If you have any questions or concerns, please don't hesitate to call.     Sincerely,        ANDREW Torres CNP

## 2021-11-09 NOTE — LETTER
[address] [facility]  Phone: [phone]  Fax: [phone]    Homero [address] -CNP            Patient: Komal Caputo   YOB: 1965   Date of Visit: 11/9/2021       To Whom It May Concern: It is my medical opinion that Komal Caputo may return to work on 11/11/21. If you have any questions or concerns, please don't hesitate to call. Sincerely,        Frank Wolf.  Bing REYESCNP

## 2021-11-11 ENCOUNTER — TELEPHONE (OUTPATIENT)
Dept: FAMILY MEDICINE CLINIC | Age: 56
End: 2021-11-11

## 2021-11-11 NOTE — LETTER
[address]  Phone: [phone]  Fax: [phone]    Stefanie Lamb, [facility]        November 11, 2021     Patient: Martin Lujan   YOB: 1965   Date of Visit: 11/11/2021       To Whom it May Concern:    Martin Lujan was seen in my clinic on 11/9/2021. She may return to work on 11/12/2021. If you have any questions or concerns, please don't hesitate to call.     Sincerely,         Stefanie Lamb

## 2021-11-11 NOTE — Clinical Note
[address]  Phone: [phone]  Fax: [phone]    Sachin Christopher MD        November 11, 2021     Patient: Garrett Hernandez   YOB: 1965   Date of Visit: 11/11/2021       To Whom It May Concern: It is my medical opinion that Garrett Hernandez {participation release, (activity restriction):15149}. If you have any questions or concerns, please don't hesitate to call.     Sincerely,        Sachin Christopher MD

## 2021-11-11 NOTE — TELEPHONE ENCOUNTER
Patient has been off work since 11-6-21 - 11-11-21  Patient saw Loreto beal CNP on 11-9-21  Patient is asking if RTW note can be written today  Patient will

## 2021-11-16 DIAGNOSIS — F98.8 ATTENTION DEFICIT DISORDER, UNSPECIFIED HYPERACTIVITY PRESENCE: ICD-10-CM

## 2021-11-16 NOTE — TELEPHONE ENCOUNTER
Medication and Quantity requested: vyvanse 30mg #30    Last Visit  11.09.21    Pharmacy and phone number updated in EPIC:  yes

## 2021-12-02 ENCOUNTER — OFFICE VISIT (OUTPATIENT)
Dept: FAMILY MEDICINE CLINIC | Age: 56
End: 2021-12-02
Payer: COMMERCIAL

## 2021-12-02 VITALS
OXYGEN SATURATION: 98 % | DIASTOLIC BLOOD PRESSURE: 70 MMHG | BODY MASS INDEX: 24.98 KG/M2 | HEART RATE: 88 BPM | HEIGHT: 63 IN | SYSTOLIC BLOOD PRESSURE: 130 MMHG | WEIGHT: 141 LBS

## 2021-12-02 DIAGNOSIS — Z12.31 ENCOUNTER FOR SCREENING MAMMOGRAM FOR MALIGNANT NEOPLASM OF BREAST: ICD-10-CM

## 2021-12-02 DIAGNOSIS — M54.32 SCIATICA OF LEFT SIDE: Primary | ICD-10-CM

## 2021-12-02 PROCEDURE — G8427 DOCREV CUR MEDS BY ELIG CLIN: HCPCS | Performed by: NURSE PRACTITIONER

## 2021-12-02 PROCEDURE — 3017F COLORECTAL CA SCREEN DOC REV: CPT | Performed by: NURSE PRACTITIONER

## 2021-12-02 PROCEDURE — 4004F PT TOBACCO SCREEN RCVD TLK: CPT | Performed by: NURSE PRACTITIONER

## 2021-12-02 PROCEDURE — G8420 CALC BMI NORM PARAMETERS: HCPCS | Performed by: NURSE PRACTITIONER

## 2021-12-02 PROCEDURE — G8484 FLU IMMUNIZE NO ADMIN: HCPCS | Performed by: NURSE PRACTITIONER

## 2021-12-02 PROCEDURE — 99213 OFFICE O/P EST LOW 20 MIN: CPT | Performed by: NURSE PRACTITIONER

## 2021-12-02 RX ORDER — CYCLOBENZAPRINE HCL 10 MG
10 TABLET ORAL 3 TIMES DAILY PRN
Qty: 30 TABLET | Refills: 0 | Status: SHIPPED | OUTPATIENT
Start: 2021-12-02 | End: 2021-12-12

## 2021-12-02 RX ORDER — IBUPROFEN 800 MG/1
800 TABLET ORAL
Qty: 90 TABLET | Refills: 0 | Status: SHIPPED | OUTPATIENT
Start: 2021-12-02

## 2021-12-02 RX ORDER — METHYLPREDNISOLONE 4 MG/1
TABLET ORAL
Qty: 1 KIT | Refills: 0 | Status: SHIPPED | OUTPATIENT
Start: 2021-12-02 | End: 2021-12-08

## 2021-12-02 ASSESSMENT — ENCOUNTER SYMPTOMS
RESPIRATORY NEGATIVE: 1
GASTROINTESTINAL NEGATIVE: 1
BACK PAIN: 1

## 2021-12-02 NOTE — PATIENT INSTRUCTIONS
Patient Education        Sciatica: Care Instructions  Your Care Instructions     Sciatica (say \"noa-RI-hf-kuh\") is an irritation of one of the sciatic nerves, which come from the spinal cord in the lower back. The sciatic nerves and their branches extend down through the buttock to the foot. Sciatica can develop when an injured disc in the back irritates or presses against a spinal nerve root. Its main symptom is pain, numbness, or weakness that is often worse in the leg or foot than in the back. Sciatica often will improve and go away with time. Early treatment usually includes medicines and exercises to relieve pain. Follow-up care is a key part of your treatment and safety. Be sure to make and go to all appointments, and call your doctor if you are having problems. It's also a good idea to know your test results and keep a list of the medicines you take. How can you care for yourself at home? · Take pain medicines exactly as directed. ? If the doctor gave you a prescription medicine for pain, take it as prescribed. ? If you are not taking a prescription pain medicine, ask your doctor if you can take an over-the-counter medicine. · Use heat or ice to relieve pain. ? To apply heat, put a warm water bottle, heating pad set on low, or warm cloth on your back. Do not go to sleep with a heating pad on your skin. ? To use ice, put ice or a cold pack on the area for 10 to 20 minutes at a time. Put a thin cloth between the ice and your skin. · Avoid sitting if possible, unless it feels better than standing. · Alternate lying down with short walks. Increase your walking distance as you are able to without making your symptoms worse. · Do not do anything that makes your symptoms worse. When should you call for help? Call 911 anytime you think you may need emergency care. For example, call if:    · You are unable to move a leg at all.    Call your doctor now or seek immediate medical care if:    · You have new or worse symptoms in your legs or buttocks. Symptoms may include:  ? Numbness or tingling. ? Weakness. ? Pain.     · You lose bladder or bowel control. Watch closely for changes in your health, and be sure to contact your doctor if:    · You are not getting better as expected. Where can you learn more? Go to https://chpeha.[employer] org and sign in to your [employer] account. Enter [phone] in the [facility] box to learn more about \"Sciatica: Care Instructions. \"     If you do not have an account, please click on the \"Sign Up Now\" link. Current as of: July 1, 2021               Content Version: 13.0  © 2006-2021 [employer]. Care instructions adapted under license by [facility]) [facility] ([facility]). If you have questions about a medical condition or this instruction, always ask your healthcare professional. [address] any warranty or liability for your use of this information. Patient Education        Sciatica: Exercises  Introduction  Here are some examples of typical rehabilitation exercises for your condition. Start each exercise slowly. Ease off the exercise if you start to have pain. Your doctor or physical therapist will tell you when you can start these exercises and which ones will work best for you. When you are not being active, find a comfortable position for rest. Some people are comfortable on the floor or a medium-firm bed with a small pillow under their head and another under their knees. Some people prefer to lie on their side with a pillow between their knees. Don't stay in one position for too long. Take short walks (10 to 20 minutes) every 2 to 3 hours. Avoid slopes, hills, and stairs until you feel better. Walk only distances you can manage without pain, especially leg pain. How to do the exercises  Back stretches    1. Get down on your hands and knees on the floor. 2. Relax your head and allow it to droop.  Round your back up toward the ceiling until you feel a nice stretch in your upper, middle, and lower back. Hold this stretch for as long as it feels comfortable, or about 15 to 30 seconds. 3. Return to the starting position with a flat back while you are on your hands and knees. 4. Let your back sway by pressing your stomach toward the floor. Lift your buttocks toward the ceiling. 5. Hold this position for 15 to 30 seconds. 6. Repeat 2 to 4 times. Follow-up care is a key part of your treatment and safety. Be sure to make and go to all appointments, and call your doctor if you are having problems. It's also a good idea to know your test results and keep a list of the medicines you take. Where can you learn more? Go to https://Pureshield.[employer]. org and sign in to your [employer] account. Enter X393 in the [employer] box to learn more about \"Sciatica: Exercises. \"     If you do not have an account, please click on the \"Sign Up Now\" link. Current as of: July 1, 2021               Content Version: 13.0  © 0511-7220 [employer]. Care instructions adapted under license by [facility] ([facility]). If you have questions about a medical condition or this instruction, always ask your healthcare professional. Horacio Aburto any warranty or liability for your use of this information.

## 2021-12-02 NOTE — PROGRESS NOTES
2021     Rinku Mueller (:  1965) is a 64 y.o. female, here for evaluation of the following medical concerns:    Chief Complaint   Patient presents with    Lower Back Pain     feels like she pulled backand goes down left leg, left side hurts worse, x4 days    Breast Pain     right implant is giving her pains, x1 year     Patient has low back pain into her left buttock and down to her knee after moving large boxes of [employer] four days ago. She admits to tingling in her leg, but denies loss of bowel or bladder control. States she has had this before. Additionally, she mentions she is having intermittent pain in right breast.  She states she had breast implants placed about 13 years ago. She has not seen her surgeon since. She has never had a mammogram.      Review of Systems   Constitutional: Negative. Respiratory: Negative. Cardiovascular: Negative. Gastrointestinal: Negative. Genitourinary: Negative. Musculoskeletal: Positive for back pain. Right breast pain   Neurological: Negative. Prior to Visit Medications    Medication Sig Taking? Authorizing Provider   cyclobenzaprine (FLEXERIL) 10 MG tablet Take 1 tablet by mouth 3 times daily as needed for Muscle spasms Yes Stevphen Punches, APRN - CNP   ibuprofen (ADVIL;MOTRIN) 800 MG tablet Take 1 tablet by mouth 3 times daily (with meals) Yes Stevphen Punches, APRN - CNP   methylPREDNISolone (MEDROL DOSEPACK) 4 MG tablet Take by mouth. Yes Stevphen Punches, APRN - CNP   lisdexamfetamine (VYVANSE) 30 MG capsule Take 1 capsule by mouth every morning for 30 days.  Yes Issac Lo MD   ondansetron (ZOFRAN-ODT) 4 MG disintegrating tablet Take 1 tablet by mouth 3 times daily as needed for Nausea or Vomiting Yes Stevphen Punches, APRN - CNP   ondansetron (ZOFRAN-ODT) 4 MG disintegrating tablet Take 1 tablet by mouth 3 times daily as needed for Nausea or Vomiting Yes Stevphen Punches, APRN - CNP   pantoprazole (PROTONIX) 40 MG tablet Take 1 tablet by mouth every morning (before breakfast) Yes Paul Gonzales MD   fluticasone [facility]) 50 MCG/ACT nasal spray 2 sprays by Each Nostril route daily Yes ANDREW Yoon CNP   FLUoxetine (PROZAC) 10 MG capsule Take 1 capsule by mouth daily Yes Paul Gonzales MD   lidocaine (LMX) 4 % cream Apply topically as needed bid Yes Paul Gonzales MD   albuterol sulfate HFA (PROAIR HFA) 108 (90 Base) MCG/ACT inhaler Inhale 2 puffs into the lungs every 4 hours as needed for Wheezing Yes Paul Gonzales MD   Promethazine-Phenylephrine [facility]) 6.25-5 MG/5ML syrup Take 5 mLs by mouth every 6 hours Yes ANDREW Yoon CNP        Social History     Tobacco Use    Smoking status: Current Every Day Smoker     Packs/day: 0.25     Years: 10.00     Pack years: 2.50     Types: Cigarettes    Smokeless tobacco: Never Used   Vaping Use    Vaping Use: Never used   Substance Use Topics    Alcohol use: Yes     Alcohol/week: 0.0 standard drinks     Comment: occ    Drug use: Yes     Types: Marijuana (Weed)        Vitals:    12/02/21 1148   BP: 130/70   Pulse: 88   SpO2: 98%   Weight: 141 lb (64 kg)   Height: 5' 3\" (1.6 m)     Estimated body mass index is 24.98 kg/m² as calculated from the following:    Height as of this encounter: 5' 3\" (1.6 m). Weight as of this encounter: 141 lb (64 kg). Physical Exam  Vitals and nursing note reviewed. Constitutional:       General: She is not in acute distress. Appearance: Normal appearance. She is normal weight. She is not ill-appearing. Cardiovascular:      Rate and Rhythm: Normal rate and regular rhythm. Heart sounds: Normal heart sounds, S1 normal and S2 normal. No murmur heard. Pulmonary:      Effort: Pulmonary effort is normal.      Breath sounds: Normal breath sounds and air entry. Musculoskeletal:      Lumbar back: Tenderness present. Decreased range of motion.       Comments: Left lower back is tender on palpation in buttock over sciatic nerve.     Skin:     General: Skin is warm and dry. Capillary Refill: Capillary refill takes less than 2 seconds. Neurological:      General: No focal deficit present. Mental Status: She is alert. Psychiatric:         Mood and Affect: Mood normal.     ASSESSMENT/PLAN:  1. Sciatica of left side  Cautioned drowsiness with Flexeril. Verbalized understanding  Encouraged to start stretches in 3-4 days. - cyclobenzaprine (FLEXERIL) 10 MG tablet; Take 1 tablet by mouth 3 times daily as needed for Muscle spasms  Dispense: 30 tablet; Refill: 0  - ibuprofen (ADVIL;MOTRIN) 800 MG tablet; Take 1 tablet by mouth 3 times daily (with meals)  Dispense: 90 tablet; Refill: 0  - methylPREDNISolone (MEDROL DOSEPACK) 4 MG tablet; Take by mouth. Dispense: 1 kit; Refill: 0    2. Encounter for screening mammogram for malignant neoplasm of breast  Strongly recommended patient call to schedule mammogram, states she will call today  - [facility] HIEU DIGITAL DIAGNOSTIC BILATERAL; Future    Return if symptoms worsen or fail to improve.

## 2021-12-02 NOTE — LETTER
[address]  Phone: [phone]  Fax: [phone]    ANDREW Gutierrez CNP        December 2, 2021     Patient: Beatris Cooley   YOB: 1965   Date of Visit: 12/2/2021       To Whom it May Concern:    Beatris Cooley was seen in my clinic on 12/2/2021. She may return to work on Saturday, December 4th, 2021. If you have any questions or concerns, please don't hesitate to call.     Sincerely,         ANDREW Gutierrez CNP

## 2021-12-06 ENCOUNTER — TELEPHONE (OUTPATIENT)
Dept: FAMILY MEDICINE CLINIC | Age: 56
End: 2021-12-06

## 2021-12-06 NOTE — TELEPHONE ENCOUNTER
The patient would like the note to say she can return to work 12/7/21. The patient would like the note to say she is released at full capacity to go back to work. Patient would like to  note this afternoon.

## 2021-12-06 NOTE — LETTER
[address]  Phone: [phone]  Fax: [phone]    Graham Palumbo MD        December 6, 2021     Patient: Caron Pierce   YOB: 1965   Date of Visit: 12/2/2021       To Whom It May Concern: It is my medical opinion that Caron Pierce may return to full duty immediately with no restrictions. If you have any questions or concerns, please don't hesitate to call.     Sincerely,        Graham Palumbo MD

## 2021-12-06 NOTE — TELEPHONE ENCOUNTER
Pt would like the note to state that she can return to work tomorrow. Also she would like to be able to  the note  This afternoon. Please advise. Mirna Beyer

## 2021-12-06 NOTE — TELEPHONE ENCOUNTER
----- Message from CHARISSE ACEVES sent at 12/4/2021  2:03 PM EST -----  Subject: Message to Provider    QUESTIONS  Information for Provider? Patient is still in pain from pulling her back   out and would like to get a doctors note for work stating to come back to   work on Monday instead. Please give her a call back. ---------------------------------------------------------------------------  --------------  Mari MURRY  What is the best way for the office to contact you? OK to leave message on   voicemail  Preferred Call Back Phone Number? [phone]  ---------------------------------------------------------------------------  --------------  SCRIPT ANSWERS  Relationship to Patient?  Self

## 2021-12-07 NOTE — TELEPHONE ENCOUNTER
Now, patient states she wants to now return to work on Thursday, December 9. She is no longer on Dr. Geraldine Brown schedule for today. Not sure why. Please advise.

## 2021-12-07 NOTE — TELEPHONE ENCOUNTER
Patient called and she wants a new letter to return to work tomorrow, December 8, instead of today, December 7. She said she is having a lot of back pain and thinks she will go to the hospital to get a shot. She also has an appointment with Dr. Leandra Hoffman today. I told her she should keep the appointment and get the letter at that time. Does Christian Sandoval want to see her today?

## 2021-12-07 NOTE — TELEPHONE ENCOUNTER
I am not changing the note. She can have a return to work note to return today as requested for the second letter. There will be no more letters. In the future, if she request a letter as she did in the visit, that will be the only letter she receives. If she wants anything further she will need to be seen. Please write the letter to return today and I will sign.

## 2021-12-10 ENCOUNTER — TELEPHONE (OUTPATIENT)
Dept: FAMILY MEDICINE CLINIC | Age: 56
End: 2021-12-10

## 2021-12-10 NOTE — TELEPHONE ENCOUNTER
domingo Put pt on the schedule for 115 on Monday she would like to speak with dr Stephanie Allen on issues going on. States she only wants to see you and will be going to the hospital due to her back.  Pt is upset saying she is going to be losing her job

## 2021-12-13 ENCOUNTER — OFFICE VISIT (OUTPATIENT)
Dept: FAMILY MEDICINE CLINIC | Age: 56
End: 2021-12-13
Payer: COMMERCIAL

## 2021-12-13 VITALS
BODY MASS INDEX: 25.69 KG/M2 | HEART RATE: 108 BPM | HEIGHT: 63 IN | OXYGEN SATURATION: 97 % | DIASTOLIC BLOOD PRESSURE: 70 MMHG | WEIGHT: 145 LBS | SYSTOLIC BLOOD PRESSURE: 130 MMHG

## 2021-12-13 DIAGNOSIS — M54.10 RADICULAR LOW BACK PAIN: Primary | ICD-10-CM

## 2021-12-13 PROCEDURE — G8427 DOCREV CUR MEDS BY ELIG CLIN: HCPCS | Performed by: FAMILY MEDICINE

## 2021-12-13 PROCEDURE — 4004F PT TOBACCO SCREEN RCVD TLK: CPT | Performed by: FAMILY MEDICINE

## 2021-12-13 PROCEDURE — 99213 OFFICE O/P EST LOW 20 MIN: CPT | Performed by: FAMILY MEDICINE

## 2021-12-13 PROCEDURE — G8484 FLU IMMUNIZE NO ADMIN: HCPCS | Performed by: FAMILY MEDICINE

## 2021-12-13 PROCEDURE — G8419 CALC BMI OUT NRM PARAM NOF/U: HCPCS | Performed by: FAMILY MEDICINE

## 2021-12-13 PROCEDURE — 3017F COLORECTAL CA SCREEN DOC REV: CPT | Performed by: FAMILY MEDICINE

## 2021-12-13 ASSESSMENT — ENCOUNTER SYMPTOMS
GASTROINTESTINAL NEGATIVE: 1
RESPIRATORY NEGATIVE: 1
BACK PAIN: 1

## 2021-12-13 NOTE — PROGRESS NOTES
Romario Friedman (:  1965) is a 64 y.o. female,Established patient, here for evaluation of the following chief complaint(s):  Back Pain (2 weeks, sciatic left side, )         ASSESSMENT/PLAN:  1. Radicular low back pain  -     MRI LUMBAR SPINE WO CONTRAST; Future  -     Ambulatory referral to Physical Therapy  Note for work to return tomorrow    Return if symptoms worsen or fail to improve. Subjective   SUBJECTIVE/OBJECTIVE:  HPI  L sciatic nerve pain xs > 2 weeks and was seen at [facility] 2 days ago and received a steroid injection. Saw Shiela Abdul on  and on ibuprofen and had a previous MDP. Starts in her lower back and radiates down L leg. Patient states she is doing better since the injection and would like to return to work tomorrow. Review of Systems   Constitutional: Positive for activity change. Respiratory: Negative. Cardiovascular: Negative. Gastrointestinal: Negative. Endocrine: Negative. Genitourinary: Negative. Musculoskeletal: Positive for back pain. Neurological: Positive for numbness (L leg). Psychiatric/Behavioral: The patient is nervous/anxious. Objective   Physical Exam  Constitutional:       General: She is not in acute distress. Appearance: She is not ill-appearing, toxic-appearing or diaphoretic. HENT:      Head: Normocephalic and atraumatic. Eyes:      Conjunctiva/sclera: Conjunctivae normal.   Skin:     General: Skin is warm and dry. Neurological:      Mental Status: She is alert and oriented to person, place, and time. Psychiatric:         Behavior: Behavior normal.         Thought Content: Thought content normal.         Judgment: Judgment normal.                  An electronic signature was used to authenticate this note.     --Chace Grossman MD

## 2021-12-20 DIAGNOSIS — F98.8 ATTENTION DEFICIT DISORDER, UNSPECIFIED HYPERACTIVITY PRESENCE: ICD-10-CM

## 2021-12-20 NOTE — TELEPHONE ENCOUNTER
----- Message from Brett Roque sent at 12/18/2021 10:17 AM EST -----  Subject: Refill Request    QUESTIONS  Name of Medication? lisdexamfetamine (VYVANSE) 30 MG capsule  Patient-reported dosage and instructions? Take once a day  How many days do you have left? Unknown  Preferred Pharmacy? [address] [facility]  Pharmacy phone number (if available)? [phone]  ---------------------------------------------------------------------------  --------------  Daya MURRY  What is the best way for the office to contact you? OK to leave message on   voicemail  Preferred Call Back Phone Number?  [phone]

## 2021-12-20 NOTE — TELEPHONE ENCOUNTER
Medication:   Requested Prescriptions     Pending Prescriptions Disp Refills    lisdexamfetamine (VYVANSE) 30 MG capsule 30 capsule 0     Sig: Take 1 capsule by mouth every morning for 30 days. Last Filled:  11/16/21    Patient Phone Number: [phone] (home)     Last appt: 12/13/2021   Next appt: Visit date not found    Last OARRS:   RX Monitoring 9/13/2021   Attestation -   Periodic Controlled Substance Monitoring No signs of potential drug abuse or diversion identified.

## 2022-01-06 ENCOUNTER — TELEPHONE (OUTPATIENT)
Dept: FAMILY MEDICINE CLINIC | Age: 57
End: 2022-01-06

## 2022-01-06 DIAGNOSIS — U07.1 COVID-19: Primary | ICD-10-CM

## 2022-01-06 NOTE — TELEPHONE ENCOUNTER
Pt is scheduled for a Covid test today at 2:30.   Need order    Symptoms:  Headache  Cough  Fever  Diarrhea   Body aches  (started 4 days ago)

## 2022-01-07 ENCOUNTER — NURSE ONLY (OUTPATIENT)
Dept: FAMILY MEDICINE CLINIC | Age: 57
End: 2022-01-07

## 2022-01-07 ENCOUNTER — TELEPHONE (OUTPATIENT)
Dept: FAMILY MEDICINE CLINIC | Age: 57
End: 2022-01-07

## 2022-01-07 DIAGNOSIS — U07.1 COVID-19: ICD-10-CM

## 2022-01-07 NOTE — TELEPHONE ENCOUNTER
Pt is scheduled to have a mammogram on Monday 1/10/22 and they would like to know if this is a screening or a diagnostic, If it is a diagnostic a new code needs to be sent. But if it just a screening they can use the order Dr. Cunningham Cassette placed on 9/13/21    Please advise. Luz Rendon

## 2022-01-08 LAB — SARS-COV-2: NOT DETECTED

## 2022-01-11 ENCOUNTER — TELEPHONE (OUTPATIENT)
Dept: FAMILY MEDICINE CLINIC | Age: 57
End: 2022-01-11

## 2022-01-11 NOTE — TELEPHONE ENCOUNTER
Patient hung up and stated she didn't want to discuss it anymore [facility] said to inform [facility]CP

## 2022-01-11 NOTE — TELEPHONE ENCOUNTER
----- Message from Amber Milner sent at 1/11/2022  1:34 PM EST -----  Subject: Message to Provider    QUESTIONS  Information for Provider? Patient missed call and requesting a call back. She said she never recieved the cologuard  ---------------------------------------------------------------------------  --------------  CALL BACK INFO  What is the best way for the office to contact you? OK to leave message on   voicemail  Preferred Call Back Phone Number? [phone]  ---------------------------------------------------------------------------  --------------  SCRIPT ANSWERS  Relationship to Patient?  Self
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
## 2022-01-24 DIAGNOSIS — F98.8 ATTENTION DEFICIT DISORDER, UNSPECIFIED HYPERACTIVITY PRESENCE: ICD-10-CM

## 2022-01-24 NOTE — TELEPHONE ENCOUNTER
Medication:   Requested Prescriptions     Pending Prescriptions Disp Refills    lisdexamfetamine (VYVANSE) 30 MG capsule 30 capsule 0     Sig: Take 1 capsule by mouth every morning for 30 days. Last Filled:  12/20/21    Patient Phone Number: [phone] (home)     Last appt: 12/13/2021   Next appt: Visit date not found    Last OARRS:   RX Monitoring 9/13/2021   Attestation -   Periodic Controlled Substance Monitoring No signs of potential drug abuse or diversion identified.

## 2022-01-24 NOTE — TELEPHONE ENCOUNTER
Medication:   Requested Prescriptions     Pending Prescriptions Disp Refills    lisdexamfetamine (VYVANSE) 30 MG capsule 30 capsule 0     Sig: Take 1 capsule by mouth every morning for 30 days. Last Filled:  12/20/21    Patient Phone Number: [phone] (home)     Last appt: 12/13/2021   Next appt: Visit date not found    Last OARRS:   RX Monitoring 9/13/2021   Attestation -   Periodic Controlled Substance Monitoring No signs of potential drug abuse or diversion identified.

## 2022-01-24 NOTE — TELEPHONE ENCOUNTER
----- Message from Sofía Jadelliott sent at 1/22/2022  9:37 AM EST -----  Subject: Refill Request    QUESTIONS  Name of Medication? lisdexamfetamine (VYVANSE) 30 MG capsule  Patient-reported dosage and instructions? once daily   How many days do you have left? 2  Preferred Pharmacy? [address] [place] [address]  Pharmacy phone number (if available)? [phone]  ---------------------------------------------------------------------------  --------------  Freddy Danger INFO  What is the best way for the office to contact you? OK to leave message on   voicemail  Preferred Call Back Phone Number?  [phone]

## 2022-01-24 NOTE — TELEPHONE ENCOUNTER
----- Message from [facility] sent at 1/24/2022 12:49 PM EST -----  Subject: Refill Request    QUESTIONS  Name of Medication? lisdexamfetamine (VYVANSE) 30 MG capsule  Patient-reported dosage and instructions? 1 capsule by mouth daily  How many days do you have left? 1  Preferred Pharmacy? [address] [facility]  Pharmacy phone number (if available)? [phone]  Additional Information for Provider? client called in she has submitted   several time for this refill . she would like to have the medication   filled before dark today  ---------------------------------------------------------------------------  --------------  CALL BACK INFO  What is the best way for the office to contact you? OK to leave message on   voicemail  Preferred Call Back Phone Number?  [phone]

## 2022-02-17 NOTE — TELEPHONE ENCOUNTER
Pt followed up on letter. She is wondering if it will be available for today. She would really like to go back to work tomorrow. Are we able to send this to Dr. Leandra Hoffman. Since he signed off on the first letter as well. Please advise. Luzmaria Shipley UTI UTI UTI

## 2022-02-18 DIAGNOSIS — F98.8 ATTENTION DEFICIT DISORDER, UNSPECIFIED HYPERACTIVITY PRESENCE: ICD-10-CM

## 2022-02-18 NOTE — TELEPHONE ENCOUNTER
Medication and Quantity requested: lisdexamfetamine (VYVANSE) 30 MG capsule-QTY.  30 capsules         Last Visit  12/13/21    Pharmacy and phone number updated in EPIC:  yes  Ira Elena

## 2022-02-18 NOTE — TELEPHONE ENCOUNTER
Medication:   Requested Prescriptions     Pending Prescriptions Disp Refills    lisdexamfetamine (VYVANSE) 30 MG capsule 30 capsule 0     Sig: Take 1 capsule by mouth every morning for 30 days. Last Filled:  1/24/22    Patient Phone Number: [phone] (home)     Last appt: 12/13/2021   Next appt: Visit date not found    Last OARRS:   RX Monitoring 9/13/2021   Attestation -   Periodic Controlled Substance Monitoring No signs of potential drug abuse or diversion identified.

## 2022-02-21 DIAGNOSIS — F98.8 ATTENTION DEFICIT DISORDER, UNSPECIFIED HYPERACTIVITY PRESENCE: ICD-10-CM

## 2022-02-21 NOTE — TELEPHONE ENCOUNTER
Medication:   Requested Prescriptions     Pending Prescriptions Disp Refills    lisdexamfetamine (VYVANSE) 30 MG capsule 30 capsule 0     Sig: Take 1 capsule by mouth every morning for 30 days. Last Filled: 01/24/2022     Patient Phone Number: [phone] (home)     Last appt: 12/13/2021   Next appt: Visit date not found    Last OARRS:   RX Monitoring 2/18/2022   Attestation -   Periodic Controlled Substance Monitoring No signs of potential drug abuse or diversion identified.

## 2022-03-14 ENCOUNTER — OFFICE VISIT (OUTPATIENT)
Dept: FAMILY MEDICINE CLINIC | Age: 57
End: 2022-03-14
Payer: COMMERCIAL

## 2022-03-14 VITALS
DIASTOLIC BLOOD PRESSURE: 78 MMHG | OXYGEN SATURATION: 100 % | HEART RATE: 99 BPM | WEIGHT: 149 LBS | BODY MASS INDEX: 26.39 KG/M2 | SYSTOLIC BLOOD PRESSURE: 110 MMHG

## 2022-03-14 DIAGNOSIS — M54.42 ACUTE MIDLINE LOW BACK PAIN WITH LEFT-SIDED SCIATICA: Primary | ICD-10-CM

## 2022-03-14 PROCEDURE — 3017F COLORECTAL CA SCREEN DOC REV: CPT | Performed by: FAMILY MEDICINE

## 2022-03-14 PROCEDURE — G8484 FLU IMMUNIZE NO ADMIN: HCPCS | Performed by: FAMILY MEDICINE

## 2022-03-14 PROCEDURE — G8427 DOCREV CUR MEDS BY ELIG CLIN: HCPCS | Performed by: FAMILY MEDICINE

## 2022-03-14 PROCEDURE — 4004F PT TOBACCO SCREEN RCVD TLK: CPT | Performed by: FAMILY MEDICINE

## 2022-03-14 PROCEDURE — 99213 OFFICE O/P EST LOW 20 MIN: CPT | Performed by: FAMILY MEDICINE

## 2022-03-14 PROCEDURE — G8419 CALC BMI OUT NRM PARAM NOF/U: HCPCS | Performed by: FAMILY MEDICINE

## 2022-03-14 RX ORDER — CYCLOBENZAPRINE HCL 10 MG
10 TABLET ORAL 3 TIMES DAILY PRN
Qty: 15 TABLET | Refills: 0 | Status: SHIPPED | OUTPATIENT
Start: 2022-03-14

## 2022-03-14 RX ORDER — NAPROXEN 500 MG/1
500 TABLET ORAL 2 TIMES DAILY WITH MEALS
Qty: 42 TABLET | Refills: 0 | Status: SHIPPED | OUTPATIENT
Start: 2022-03-14 | End: 2022-04-04

## 2022-03-14 NOTE — LETTER
[address] [address]  Phone: [phone]  Fax: [phone]    Patient: Georgina Ng  YOB: 1965  Encounter Date: 3/14/2022      Please excuse Rene Styles from work/school on 3/11/22 and 3/14/2022 due to illness/ doctor's appointment. Please call my office if you have any questions.     Sincerely,    Esperanza Nieto MD

## 2022-03-14 NOTE — PROGRESS NOTES
[address] Note    Date: 3/14/2022                                               Brian Howe:     Chief Complaint   Patient presents with    Back Pain     from moving carpet. . bruised back. . PT       HPI   Midline low back pain starting 3 days after lifting a heavy carpet. +radiates down L leg at times. Is worse with sitting, better with standing. Stable in severity. Patient Active Problem List    Diagnosis Date Noted    Attention deficit disorder (ADD) without hyperactivity 10/16/2018       Past Medical History:   Diagnosis Date    ADD (attention deficit disorder) 3/4/2016       Current Outpatient Medications   Medication Sig Dispense Refill    naproxen (NAPROSYN) 500 MG tablet Take 1 tablet by mouth 2 times daily (with meals) for 21 days 42 tablet 0    cyclobenzaprine (FLEXERIL) 10 MG tablet Take 1 tablet by mouth 3 times daily as needed for Muscle spasms 15 tablet 0    lisdexamfetamine (VYVANSE) 30 MG capsule Take 1 capsule by mouth every morning for 30 days. 30 capsule 0    ibuprofen (ADVIL;MOTRIN) 800 MG tablet Take 1 tablet by mouth 3 times daily (with meals) 90 tablet 0    ondansetron (ZOFRAN-ODT) 4 MG disintegrating tablet Take 1 tablet by mouth 3 times daily as needed for Nausea or Vomiting 21 tablet 0    pantoprazole (PROTONIX) 40 MG tablet Take 1 tablet by mouth every morning (before breakfast) 30 tablet 5    fluticasone (FLONASE) 50 MCG/ACT nasal spray 2 sprays by Each Nostril route daily 1 Bottle 5    FLUoxetine (PROZAC) 10 MG capsule Take 1 capsule by mouth daily 30 capsule 3    lidocaine (LMX) 4 % cream Apply topically as needed bid 60 g 1    albuterol sulfate HFA (PROAIR HFA) 108 (90 Base) MCG/ACT inhaler Inhale 2 puffs into the lungs every 4 hours as needed for Wheezing 1 Inhaler 2    Promethazine-Phenylephrine (PHENERGAN-VC) 6.25-5 MG/5ML syrup Take 5 mLs by mouth every 6 hours 120 mL 0     No current facility-administered medications for this visit. Allergies   Allergen Reactions    Pcn [Penicillins] Other (See Comments)       Review of Systems   No urinary incontinence. No numbness    Vitals:  /78   Pulse 99   Wt 149 lb (67.6 kg)   LMP 01/24/2019   SpO2 100%   BMI 26.39 kg/m²     Wt Readings from Last 3 Encounters:   03/14/22 149 lb (67.6 kg)   12/13/21 145 lb (65.8 kg)   12/02/21 141 lb (64 kg)        Physical Exam   General:  Well-appearing, NAD, alert, non-toxic  HEENT:  Normocephalic, atraumatic. CHEST/LUNGS: No dyspnea  EXTREMETIES: Normal movement of all extremities. No edema. No joint swelling. SKIN:  No rash, no cellulitis, no bruising, no petechiae/purpura/vesicles/pustules/abscess  BACK: No tenderness to palpation low back. Full range of motion with waist flexion with some pain elicited  NEURO:  GCS 15, CN2-12 grossly intact, no focal motor/sensory deficits, normal gait, normal speech      Assessment/Plan     64 y. o.yo female with acute low back pain. Likely due to muscle strain. Recommend rest, Naproxen. Flexeril as needed for pain. Discussed with patient medication/s dosage, instructions, potential S/E, A/R and Drug Interaction  Educational material provided regarding patient's condition  Advise to return here if worse or go to nearest ER  Encourage fluids  Pt discharged in stable condition at 17:34        1. Acute midline low back pain with left-sided sciatica  -     naproxen (NAPROSYN) 500 MG tablet; Take 1 tablet by mouth 2 times daily (with meals) for 21 days, Disp-42 tablet, R-0Normal  -     cyclobenzaprine (FLEXERIL) 10 MG tablet; Take 1 tablet by mouth 3 times daily as needed for Muscle spasms, Disp-15 tablet, R-0Normal       No orders of the defined types were placed in this encounter. No follow-ups on file.     Chani Gagnon MD, MD    3/14/2022  5:33 PM

## 2022-03-15 ENCOUNTER — TELEPHONE (OUTPATIENT)
Dept: FAMILY MEDICINE CLINIC | Age: 57
End: 2022-03-15

## 2022-03-15 NOTE — TELEPHONE ENCOUNTER
Patient is requesting a work excuse letter for yesterday and today. She did not go to work today. Please contact when the letter is ready for .

## 2022-03-15 NOTE — LETTER
[address]  Phone: [phone]  Fax: [phone]    Fabi Giron MD        March 15, 2022     Patient: Wilnette Cheadle   YOB: 1965   Date of Visit: 3/15/2022       To Whom it May Concern:    Wilnette Cheadle was seen in my clinic on 3/14/22. She may return to work on 3/16/22. Please also excuse her from work today due to medical problems. If you have any questions or concerns, please don't hesitate to call.     Sincerely,         Fabi Giron MD

## 2022-03-16 NOTE — TELEPHONE ENCOUNTER
The patient calls in today, 3/16/2022, and states that she was unable to return to work today due to back spasms. The patient is requesting the return to work letter to be amended to return to work 3/17/2022. The patient will have her son  the amended letter. Please notify patient when letter is ready. Call patient's son, Betzaida Martinez, when letter is ready.     Betzaida Martinez #[phone]

## 2022-03-18 ENCOUNTER — OFFICE VISIT (OUTPATIENT)
Dept: FAMILY MEDICINE CLINIC | Age: 57
End: 2022-03-18
Payer: COMMERCIAL

## 2022-03-18 VITALS
BODY MASS INDEX: 26.22 KG/M2 | HEART RATE: 80 BPM | WEIGHT: 148 LBS | OXYGEN SATURATION: 99 % | SYSTOLIC BLOOD PRESSURE: 142 MMHG | HEIGHT: 63 IN | DIASTOLIC BLOOD PRESSURE: 82 MMHG

## 2022-03-18 DIAGNOSIS — M54.42 ACUTE MIDLINE LOW BACK PAIN WITH LEFT-SIDED SCIATICA: Primary | ICD-10-CM

## 2022-03-18 PROCEDURE — G8427 DOCREV CUR MEDS BY ELIG CLIN: HCPCS | Performed by: STUDENT IN AN ORGANIZED HEALTH CARE EDUCATION/TRAINING PROGRAM

## 2022-03-18 PROCEDURE — G8419 CALC BMI OUT NRM PARAM NOF/U: HCPCS | Performed by: STUDENT IN AN ORGANIZED HEALTH CARE EDUCATION/TRAINING PROGRAM

## 2022-03-18 PROCEDURE — 3017F COLORECTAL CA SCREEN DOC REV: CPT | Performed by: STUDENT IN AN ORGANIZED HEALTH CARE EDUCATION/TRAINING PROGRAM

## 2022-03-18 PROCEDURE — 99213 OFFICE O/P EST LOW 20 MIN: CPT | Performed by: STUDENT IN AN ORGANIZED HEALTH CARE EDUCATION/TRAINING PROGRAM

## 2022-03-18 PROCEDURE — 4004F PT TOBACCO SCREEN RCVD TLK: CPT | Performed by: STUDENT IN AN ORGANIZED HEALTH CARE EDUCATION/TRAINING PROGRAM

## 2022-03-18 PROCEDURE — G8484 FLU IMMUNIZE NO ADMIN: HCPCS | Performed by: STUDENT IN AN ORGANIZED HEALTH CARE EDUCATION/TRAINING PROGRAM

## 2022-03-18 NOTE — PATIENT INSTRUCTIONS
Patient Education        Low Back Pain: Exercises  Introduction  Here are some examples of exercises for you to try. The exercises may be suggested for a condition or for rehabilitation. Start each exercise slowly. Ease off the exercises if you start to have pain. You will be told when to start these exercises and which ones will work best for you. How to do the exercises  Press-up    1. Lie on your stomach, supporting your body with your forearms. 2. Press your elbows down into the floor to raise your upper back. As you do this, relax your stomach muscles and allow your back to arch without using your back muscles. As your press up, do not let your hips or pelvis come off the floor. 3. Hold for 15 to 30 seconds, then relax. 4. Repeat 2 to 4 times. Alternate arm and leg (bird dog) exercise    Do this exercise slowly. Try to keep your body straight at all times, and do not let one hip drop lower than the other. 1. Start on the floor, on your hands and knees. 2. Tighten your belly muscles. 3. Raise one leg off the floor, and hold it straight out behind you. Be careful not to let your hip drop down, because that will twist your trunk. 4. Hold for about 6 seconds, then lower your leg and switch to the other leg. 5. Repeat 8 to 12 times on each leg. 6. Over time, work up to holding for 10 to 30 seconds each time. 7. If you feel stable and secure with your leg raised, try raising the opposite arm straight out in front of you at the same time. Knee-to-chest exercise    1. Lie on your back with your knees bent and your feet flat on the floor. 2. Bring one knee to your chest, keeping the other foot flat on the floor (or keeping the other leg straight, whichever feels better on your lower back). 3. Keep your lower back pressed to the floor. Hold for at least 15 to 30 seconds. 4. Relax, and lower the knee to the starting position. 5. Repeat with the other leg. Repeat 2 to 4 times with each leg.   6. To get more stretch, put your other leg flat on the floor while pulling your knee to your chest.  Curl-ups    1. Lie on the floor on your back with your knees bent at a 90-degree angle. Your feet should be flat on the floor, about 12 inches from your buttocks. 2. Cross your arms over your chest. If this bothers your neck, try putting your hands behind your neck (not your head), with your elbows spread apart. 3. Slowly tighten your belly muscles and raise your shoulder blades off the floor. 4. Keep your head in line with your body, and do not press your chin to your chest.  5. Hold this position for 1 or 2 seconds, then slowly lower yourself back down to the floor. 6. Repeat 8 to 12 times. Pelvic tilt exercise    1. Lie on your back with your knees bent. 2. \"Brace\" your stomach. This means to tighten your muscles by pulling in and imagining your belly button moving toward your spine. You should feel like your back is pressing to the floor and your hips and pelvis are rocking back. 3. Hold for about 6 seconds while you breathe smoothly. 4. Repeat 8 to 12 times. Heel dig bridging    1. Lie on your back with both knees bent and your ankles bent so that only your heels are digging into the floor. Your knees should be bent about 90 degrees. 2. Then push your heels into the floor, squeeze your buttocks, and lift your hips off the floor until your shoulders, hips, and knees are all in a straight line. 3. Hold for about 6 seconds as you continue to breathe normally, and then slowly lower your hips back down to the floor and rest for up to 10 seconds. 4. Do 8 to 12 repetitions. Hamstring stretch in doorway    1. Lie on your back in a doorway, with one leg through the open door. 2. Slide your leg up the wall to straighten your knee. You should feel a gentle stretch down the back of your leg. 3. Hold the stretch for at least 15 to 30 seconds. Do not arch your back, point your toes, or bend either knee.  Keep one heel touching the floor and the other heel touching the wall. 4. Repeat with your other leg. 5. Do 2 to 4 times for each leg. Hip flexor stretch    1. Kneel on the floor with one knee bent and one leg behind you. Place your forward knee over your foot. Keep your other knee touching the floor. 2. Slowly push your hips forward until you feel a stretch in the upper thigh of your rear leg. 3. Hold the stretch for at least 15 to 30 seconds. Repeat with your other leg. 4. Do 2 to 4 times on each side. Wall sit    1. Stand with your back 10 to 12 inches away from a wall. 2. Lean into the wall until your back is flat against it. 3. Slowly slide down until your knees are slightly bent, pressing your lower back into the wall. 4. Hold for about 6 seconds, then slide back up the wall. 5. Repeat 8 to 12 times. Follow-up care is a key part of your treatment and safety. Be sure to make and go to all appointments, and call your doctor if you are having problems. It's also a good idea to know your test results and keep a list of the medicines you take. Where can you learn more? Go to [email or web address]. org and sign in to your [employer] account. Enter Y139 in the [facility] box to learn more about \"Low Back Pain: Exercises. \"     If you do not have an account, please click on the \"Sign Up Now\" link. Current as of: July 1, 2021               Content Version: 13.1  © 2006-2021 Healthwise, Incorporated. Care instructions adapted under license by [facility] ([facility]). If you have questions about a medical condition or this instruction, always ask your healthcare professional. [place] [address] any warranty or liability for your use of this information.

## 2022-03-18 NOTE — LETTER
[address]  Phone: [phone]  Fax: [phone]    Christy Parker MD        March 18, 2022     Patient: Maria Del Carmen Maede   YOB: 1965   Date of Visit: 3/18/2022       To Whom It May Concern:    I evaluated Ms. Broussard today for an office visit at [address] [place] [address]. It is my medical opinion that Maria Del Carmen Meade should be excused from work 3/11/22 to 3/18/22. It is my medical opinion that Joseph Prabhakar is released to work full duty without restrictions 3/19/22. If you have any questions or concerns, please don't hesitate to call.     Sincerely,        Christy Parker MD

## 2022-03-18 NOTE — PROGRESS NOTES
130/70     The ASCVD Risk score (Media Pile., et al., 2013) failed to calculate for the following reasons:    Cannot find a previous HDL lab    Cannot find a previous total cholesterol lab    ROS: denies nausea/vomiting, fevers, chills, chest pain, shortness of breath, diarrhea, constipation, blood in the urine or stool         Patient Active Problem List   Diagnosis    Attention deficit disorder (ADD) without hyperactivity     Past Medical History:   Diagnosis Date    ADD (attention deficit disorder) 3/4/2016       Past Surgical History:   Procedure Laterality Date    BREAST SURGERY  2009    implants       Current Outpatient Medications   Medication Sig Dispense Refill    naproxen (NAPROSYN) 500 MG tablet Take 1 tablet by mouth 2 times daily (with meals) for 21 days 42 tablet 0    cyclobenzaprine (FLEXERIL) 10 MG tablet Take 1 tablet by mouth 3 times daily as needed for Muscle spasms 15 tablet 0    lisdexamfetamine (VYVANSE) 30 MG capsule Take 1 capsule by mouth every morning for 30 days. 30 capsule 0    ibuprofen (ADVIL;MOTRIN) 800 MG tablet Take 1 tablet by mouth 3 times daily (with meals) 90 tablet 0    ondansetron (ZOFRAN-ODT) 4 MG disintegrating tablet Take 1 tablet by mouth 3 times daily as needed for Nausea or Vomiting 21 tablet 0    pantoprazole (PROTONIX) 40 MG tablet Take 1 tablet by mouth every morning (before breakfast) 30 tablet 5    fluticasone (FLONASE) 50 MCG/ACT nasal spray 2 sprays by Each Nostril route daily 1 Bottle 5    FLUoxetine (PROZAC) 10 MG capsule Take 1 capsule by mouth daily 30 capsule 3    lidocaine (LMX) 4 % cream Apply topically as needed bid 60 g 1    albuterol sulfate HFA (PROAIR HFA) 108 (90 Base) MCG/ACT inhaler Inhale 2 puffs into the lungs every 4 hours as needed for Wheezing 1 Inhaler 2    Promethazine-Phenylephrine (PHENERGAN-VC) 6.25-5 MG/5ML syrup Take 5 mLs by mouth every 6 hours 120 mL 0     No current facility-administered medications for this visit. Allergies   Allergen Reactions    Pcn [Penicillins] Other (See Comments)       Social History     Socioeconomic History    Marital status: Single     Spouse name: None    Number of children: None    Years of education: None    Highest education level: None   Occupational History    None   Tobacco Use    Smoking status: Current Every Day Smoker     Packs/day: 0.25     Years: 10.00     Pack years: 2.50     Types: Cigarettes    Smokeless tobacco: Never Used   Vaping Use    Vaping Use: Never used   Substance and Sexual Activity    Alcohol use: Yes     Alcohol/week: 0.0 standard drinks     Comment: occ    Drug use: Yes     Types: Marijuana Lyndel Fatmata)    Sexual activity: None   Other Topics Concern    None   Social History Narrative    None     Social Determinants of Health     Financial Resource Strain: Low Risk     Difficulty of Paying Living Expenses: Not hard at all   Food Insecurity: No Food Insecurity    Worried About Running Out of Food in the Last Year: Never true    Luciano of Food in the Last Year: Never true   Transportation Needs: No Transportation Needs    Lack of Transportation (Medical): No    Lack of Transportation (Non-Medical):  No   Physical Activity:     Days of Exercise per Week: Not on file    Minutes of Exercise per Session: Not on file   Stress:     Feeling of Stress : Not on file   Social Connections:     Frequency of Communication with Friends and Family: Not on file    Frequency of Social Gatherings with Friends and Family: Not on file    Attends Orthodox Services: Not on file    Active Member of Clubs or Organizations: Not on file    Attends Club or Organization Meetings: Not on file    Marital Status: Not on file   Intimate Partner Violence:     Fear of Current or Ex-Partner: Not on file    Emotionally Abused: Not on file    Physically Abused: Not on file    Sexually Abused: Not on file   Housing Stability: Unknown    Unable to Pay for Housing in the Last Year: No    Number of Places Lived in the Last Year: Not on file    Unstable Housing in the Last Year: No     Family History   Problem Relation Age of Onset    Diabetes Father          Vitals:  BP (!) 142/82   Pulse 80   Ht 5' 3\" (1.6 m)   Wt 148 lb (67.1 kg)   LMP 01/24/2019   SpO2 99%   BMI 26.22 kg/m²     Physical Exam   General:  Well-appearing, no acute distress, alert, non-toxic  HEENT:  Normocephalic, atraumatic, without lymphadenopathy, EOMI, neck supple  Cardiovascular: normal heart rate, normal rhythm, no murmurs, rubs or gallops  Respiratory: normal breath sounds, good air movement, no respiratory distress, no wheezing, rales or rhonchi  GI: bowel sounds normal, soft, non-distended, no tenderness, no masses or peritoneal signs  Extremities: intact distal pulses, warm, dry, well perfused, without clubbing, cyanosis or edema, normal movement of all extremities. No joint swelling, deformity or tenderness. Skin:  No rash, warm and dry  PSYCH:  alert and oriented x 3; normal affect  NEURO:  CN2-12 grossly intact, normal motor function, normal sensory function, normal speech, no gross focal deficits noted, gait within normal  No bony spine tenderness, negative seated SLR, 5/5 lower extremity strength bilaterally    Chelsea James MD    3/18/2022 5:12 PM    Documentation was done using voice recognition dragon software. Every effort was made to ensure accuracy; however, inadvertent, unintentional computerized transcription errors may be present.

## 2022-03-21 DIAGNOSIS — F98.8 ATTENTION DEFICIT DISORDER, UNSPECIFIED HYPERACTIVITY PRESENCE: ICD-10-CM

## 2022-03-21 NOTE — TELEPHONE ENCOUNTER
----- Message from Sheela Henderson sent at 3/19/2022  8:50 AM EDT -----  Subject: Refill Request    QUESTIONS  Name of Medication? lisdexamfetamine (VYVANSE) 30 MG capsule  Patient-reported dosage and instructions? One tablet by mouth daily  How many days do you have left? 3  Preferred Pharmacy? [address] [place] [address]  Pharmacy phone number (if available)? [phone]  ---------------------------------------------------------------------------  --------------  Lear How INFO  What is the best way for the office to contact you? OK to leave message on   voicemail  Preferred Call Back Phone Number?  [phone]

## 2022-03-21 NOTE — TELEPHONE ENCOUNTER
Medication:   Requested Prescriptions     Pending Prescriptions Disp Refills    lisdexamfetamine (VYVANSE) 30 MG capsule 30 capsule 0     Sig: Take 1 capsule by mouth every morning for 30 days. Last Filled:  2/21/22    Patient Phone Number: [phone] (home)     Last appt: 3/18/2022   Next appt: Visit date not found    Last OARRS:   RX Monitoring 2/18/2022   Attestation -   Periodic Controlled Substance Monitoring No signs of potential drug abuse or diversion identified.

## 2022-03-31 ENCOUNTER — SCHEDULED TELEPHONE ENCOUNTER (OUTPATIENT)
Dept: FAMILY MEDICINE CLINIC | Age: 57
End: 2022-03-31
Payer: COMMERCIAL

## 2022-03-31 DIAGNOSIS — J02.9 THROAT SORENESS: ICD-10-CM

## 2022-03-31 DIAGNOSIS — R09.82 POSTNASAL DRIP: ICD-10-CM

## 2022-03-31 DIAGNOSIS — Z23 NEED FOR COVID-19 VACCINE: ICD-10-CM

## 2022-03-31 DIAGNOSIS — J30.2 SEASONAL ALLERGIES: Primary | ICD-10-CM

## 2022-03-31 PROCEDURE — 99442 PR PHYS/QHP TELEPHONE EVALUATION 11-20 MIN: CPT | Performed by: FAMILY MEDICINE

## 2022-03-31 RX ORDER — FLUTICASONE PROPIONATE 50 MCG
2 SPRAY, SUSPENSION (ML) NASAL DAILY
Qty: 16 G | Refills: 2 | Status: SHIPPED | OUTPATIENT
Start: 2022-03-31 | End: 2022-06-21 | Stop reason: SDUPTHER

## 2022-03-31 RX ORDER — LORATADINE 10 MG/1
10 TABLET ORAL DAILY
Qty: 30 TABLET | Refills: 3 | Status: SHIPPED | OUTPATIENT
Start: 2022-03-31

## 2022-03-31 ASSESSMENT — PATIENT HEALTH QUESTIONNAIRE - PHQ9
1. LITTLE INTEREST OR PLEASURE IN DOING THINGS: 1
SUM OF ALL RESPONSES TO PHQ QUESTIONS 1-9: 2
SUM OF ALL RESPONSES TO PHQ QUESTIONS 1-9: 2
2. FEELING DOWN, DEPRESSED OR HOPELESS: 1
SUM OF ALL RESPONSES TO PHQ9 QUESTIONS 1 & 2: 2
SUM OF ALL RESPONSES TO PHQ QUESTIONS 1-9: 2
SUM OF ALL RESPONSES TO PHQ QUESTIONS 1-9: 2

## 2022-03-31 NOTE — LETTER
[address]  Phone: [phone]  Fax: [phone]    Alfredo Marques MD        March 31, 2022     Patient: Elvira Price   YOB: 1965   Date of Visit: 3/31/2022       To Whom it May Concern:    Elvira Price was seen in my clinic on 3/31/2022. Please excuse her from work on 03/29/2022 -04/01/2022. She may return to work on 04/02/2022. If you have any questions or concerns, please don't hesitate to call.     Sincerely,         Alfredo Marques MD

## 2022-04-01 ENCOUNTER — TELEPHONE (OUTPATIENT)
Dept: FAMILY MEDICINE CLINIC | Age: 57
End: 2022-04-01

## 2022-04-22 DIAGNOSIS — F98.8 ATTENTION DEFICIT DISORDER, UNSPECIFIED HYPERACTIVITY PRESENCE: ICD-10-CM

## 2022-04-22 NOTE — TELEPHONE ENCOUNTER
Medication and Quantity requested: lisdexamfetamine (VYVANSE) 30 MG capsule         Last Visit  3/31/22    Pharmacy and phone number updated in EPIC:  yes

## 2022-04-22 NOTE — TELEPHONE ENCOUNTER
Medication:   Requested Prescriptions     Pending Prescriptions Disp Refills    lisdexamfetamine (VYVANSE) 30 MG capsule 30 capsule 0     Sig: Take 1 capsule by mouth every morning for 30 days. Last Filled:  3/21/22    Patient Phone Number: [phone] (home)     Last appt: 3/31/2022   Next appt: Visit date not found    Last OARRS:   RX Monitoring 2/18/2022   Attestation -   Periodic Controlled Substance Monitoring No signs of potential drug abuse or diversion identified.

## 2022-05-23 DIAGNOSIS — F98.8 ATTENTION DEFICIT DISORDER, UNSPECIFIED HYPERACTIVITY PRESENCE: ICD-10-CM

## 2022-05-23 NOTE — TELEPHONE ENCOUNTER
Medication:   Requested Prescriptions     Pending Prescriptions Disp Refills    lisdexamfetamine (VYVANSE) 30 MG capsule 30 capsule 0     Sig: Take 1 capsule by mouth every morning for 30 days. Last Filled:  4/22/22    Patient Phone Number: [phone] (home)     Last appt: 3/31/2022   Next appt: Visit date not found    Last OARRS:   RX Monitoring 2/18/2022   Attestation -   Periodic Controlled Substance Monitoring No signs of potential drug abuse or diversion identified.

## 2022-05-23 NOTE — TELEPHONE ENCOUNTER
----- Message from Eliel Rodriguez sent at 5/23/2022 11:52 AM EDT -----  Subject: Refill Request    QUESTIONS  Name of Medication? lisdexamfetamine (VYVANSE) 30 MG capsule  Patient-reported dosage and instructions? once daily  How many days do you have left? 0  Preferred Pharmacy? [id number]  Pharmacy phone number (if available)? [phone]  ---------------------------------------------------------------------------  --------------  Kristel Meo INFO  What is the best way for the office to contact you? OK to leave message on   voicemail  Preferred Call Back Phone Number? [phone]  ---------------------------------------------------------------------------  --------------  SCRIPT ANSWERS  Relationship to Patient?  Self

## 2022-06-07 ENCOUNTER — NURSE TRIAGE (OUTPATIENT)
Dept: OTHER | Facility: CLINIC | Age: 57
End: 2022-06-07
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
## 2022-06-09 ENCOUNTER — OFFICE VISIT (OUTPATIENT)
Dept: FAMILY MEDICINE CLINIC | Age: 57
End: 2022-06-09
Payer: COMMERCIAL

## 2022-06-09 ENCOUNTER — TELEPHONE (OUTPATIENT)
Dept: FAMILY MEDICINE CLINIC | Age: 57
End: 2022-06-09

## 2022-06-09 ENCOUNTER — NURSE TRIAGE (OUTPATIENT)
Dept: OTHER | Facility: CLINIC | Age: 57
End: 2022-06-09

## 2022-06-09 VITALS
BODY MASS INDEX: 26.01 KG/M2 | HEIGHT: 63 IN | DIASTOLIC BLOOD PRESSURE: 74 MMHG | HEART RATE: 95 BPM | OXYGEN SATURATION: 99 % | WEIGHT: 146.8 LBS | SYSTOLIC BLOOD PRESSURE: 130 MMHG

## 2022-06-09 DIAGNOSIS — F34.1 DYSTHYMIA: Primary | ICD-10-CM

## 2022-06-09 DIAGNOSIS — T74.91XA DOMESTIC VIOLENCE OF ADULT, INITIAL ENCOUNTER: ICD-10-CM

## 2022-06-09 PROCEDURE — G8419 CALC BMI OUT NRM PARAM NOF/U: HCPCS | Performed by: FAMILY MEDICINE

## 2022-06-09 PROCEDURE — G8427 DOCREV CUR MEDS BY ELIG CLIN: HCPCS | Performed by: FAMILY MEDICINE

## 2022-06-09 PROCEDURE — 99214 OFFICE O/P EST MOD 30 MIN: CPT | Performed by: FAMILY MEDICINE

## 2022-06-09 PROCEDURE — 4004F PT TOBACCO SCREEN RCVD TLK: CPT | Performed by: FAMILY MEDICINE

## 2022-06-09 PROCEDURE — 3017F COLORECTAL CA SCREEN DOC REV: CPT | Performed by: FAMILY MEDICINE

## 2022-06-09 SDOH — ECONOMIC STABILITY: FOOD INSECURITY: WITHIN THE PAST 12 MONTHS, YOU WORRIED THAT YOUR FOOD WOULD RUN OUT BEFORE YOU GOT MONEY TO BUY MORE.: SOMETIMES TRUE

## 2022-06-09 SDOH — ECONOMIC STABILITY: FOOD INSECURITY: WITHIN THE PAST 12 MONTHS, THE FOOD YOU BOUGHT JUST DIDN'T LAST AND YOU DIDN'T HAVE MONEY TO GET MORE.: SOMETIMES TRUE

## 2022-06-09 ASSESSMENT — PATIENT HEALTH QUESTIONNAIRE - PHQ9
9. THOUGHTS THAT YOU WOULD BE BETTER OFF DEAD, OR OF HURTING YOURSELF: 0
SUM OF ALL RESPONSES TO PHQ QUESTIONS 1-9: 19
SUM OF ALL RESPONSES TO PHQ QUESTIONS 1-9: 19
7. TROUBLE CONCENTRATING ON THINGS, SUCH AS READING THE NEWSPAPER OR WATCHING TELEVISION: 2
10. IF YOU CHECKED OFF ANY PROBLEMS, HOW DIFFICULT HAVE THESE PROBLEMS MADE IT FOR YOU TO DO YOUR WORK, TAKE CARE OF THINGS AT HOME, OR GET ALONG WITH OTHER PEOPLE: 3
SUM OF ALL RESPONSES TO PHQ QUESTIONS 1-9: 19
6. FEELING BAD ABOUT YOURSELF - OR THAT YOU ARE A FAILURE OR HAVE LET YOURSELF OR YOUR FAMILY DOWN: 3
8. MOVING OR SPEAKING SO SLOWLY THAT OTHER PEOPLE COULD HAVE NOTICED. OR THE OPPOSITE, BEING SO FIGETY OR RESTLESS THAT YOU HAVE BEEN MOVING AROUND A LOT MORE THAN USUAL: 1
1. LITTLE INTEREST OR PLEASURE IN DOING THINGS: 3
SUM OF ALL RESPONSES TO PHQ9 QUESTIONS 1 & 2: 6
4. FEELING TIRED OR HAVING LITTLE ENERGY: 3
SUM OF ALL RESPONSES TO PHQ QUESTIONS 1-9: 19
5. POOR APPETITE OR OVEREATING: 1
2. FEELING DOWN, DEPRESSED OR HOPELESS: 3
3. TROUBLE FALLING OR STAYING ASLEEP: 3

## 2022-06-09 ASSESSMENT — ENCOUNTER SYMPTOMS
GASTROINTESTINAL NEGATIVE: 1
RESPIRATORY NEGATIVE: 1

## 2022-06-09 ASSESSMENT — SOCIAL DETERMINANTS OF HEALTH (SDOH): HOW HARD IS IT FOR YOU TO PAY FOR THE VERY BASICS LIKE FOOD, HOUSING, MEDICAL CARE, AND HEATING?: VERY HARD
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
## 2022-06-09 NOTE — TELEPHONE ENCOUNTER
Call patient and see if she would like an appointment for later this evening since she is currently at work.
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
## 2022-06-13 ENCOUNTER — TELEPHONE (OUTPATIENT)
Dept: FAMILY MEDICINE CLINIC | Age: 57
End: 2022-06-13

## 2022-06-14 ENCOUNTER — TELEPHONE (OUTPATIENT)
Dept: FAMILY MEDICINE CLINIC | Age: 57
End: 2022-06-14

## 2022-06-16 ENCOUNTER — OFFICE VISIT (OUTPATIENT)
Dept: FAMILY MEDICINE CLINIC | Age: 57
End: 2022-06-16
Payer: COMMERCIAL

## 2022-06-16 VITALS
BODY MASS INDEX: 25.27 KG/M2 | OXYGEN SATURATION: 98 % | WEIGHT: 142.6 LBS | HEIGHT: 63 IN | HEART RATE: 110 BPM | DIASTOLIC BLOOD PRESSURE: 79 MMHG | SYSTOLIC BLOOD PRESSURE: 117 MMHG

## 2022-06-16 DIAGNOSIS — F34.1 DYSTHYMIA: Primary | ICD-10-CM

## 2022-06-16 DIAGNOSIS — T74.91XA DOMESTIC VIOLENCE OF ADULT, INITIAL ENCOUNTER: ICD-10-CM

## 2022-06-16 PROCEDURE — G8427 DOCREV CUR MEDS BY ELIG CLIN: HCPCS | Performed by: FAMILY MEDICINE

## 2022-06-16 PROCEDURE — 4004F PT TOBACCO SCREEN RCVD TLK: CPT | Performed by: FAMILY MEDICINE

## 2022-06-16 PROCEDURE — 3017F COLORECTAL CA SCREEN DOC REV: CPT | Performed by: FAMILY MEDICINE

## 2022-06-16 PROCEDURE — G8419 CALC BMI OUT NRM PARAM NOF/U: HCPCS | Performed by: FAMILY MEDICINE

## 2022-06-16 PROCEDURE — 99213 OFFICE O/P EST LOW 20 MIN: CPT | Performed by: FAMILY MEDICINE

## 2022-06-16 ASSESSMENT — PATIENT HEALTH QUESTIONNAIRE - PHQ9
10. IF YOU CHECKED OFF ANY PROBLEMS, HOW DIFFICULT HAVE THESE PROBLEMS MADE IT FOR YOU TO DO YOUR WORK, TAKE CARE OF THINGS AT HOME, OR GET ALONG WITH OTHER PEOPLE: 3
SUM OF ALL RESPONSES TO PHQ QUESTIONS 1-9: 11
8. MOVING OR SPEAKING SO SLOWLY THAT OTHER PEOPLE COULD HAVE NOTICED. OR THE OPPOSITE, BEING SO FIGETY OR RESTLESS THAT YOU HAVE BEEN MOVING AROUND A LOT MORE THAN USUAL: 1
3. TROUBLE FALLING OR STAYING ASLEEP: 1
2. FEELING DOWN, DEPRESSED OR HOPELESS: 2
9. THOUGHTS THAT YOU WOULD BE BETTER OFF DEAD, OR OF HURTING YOURSELF: 0
SUM OF ALL RESPONSES TO PHQ9 QUESTIONS 1 & 2: 3
SUM OF ALL RESPONSES TO PHQ QUESTIONS 1-9: 11
SUM OF ALL RESPONSES TO PHQ QUESTIONS 1-9: 11
7. TROUBLE CONCENTRATING ON THINGS, SUCH AS READING THE NEWSPAPER OR WATCHING TELEVISION: 3
6. FEELING BAD ABOUT YOURSELF - OR THAT YOU ARE A FAILURE OR HAVE LET YOURSELF OR YOUR FAMILY DOWN: 3
4. FEELING TIRED OR HAVING LITTLE ENERGY: 0
1. LITTLE INTEREST OR PLEASURE IN DOING THINGS: 1
5. POOR APPETITE OR OVEREATING: 0
SUM OF ALL RESPONSES TO PHQ QUESTIONS 1-9: 11

## 2022-06-16 ASSESSMENT — ENCOUNTER SYMPTOMS
RESPIRATORY NEGATIVE: 1
GASTROINTESTINAL NEGATIVE: 1
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
## 2022-06-20 NOTE — TELEPHONE ENCOUNTER
----- Message from Jeoffrey Angelucci sent at 6/20/2022  9:16 AM EDT -----  Subject: Message to Provider    QUESTIONS  Information for Provider? Patient states that Dr. Scooby Higginbotham needs to change   question number 5 on her paperwork from 06/09/2022-07/14/2022.   ---------------------------------------------------------------------------  --------------  [address]  What is the best way for the office to contact you? OK to leave message on   voicemail  Preferred Call Back Phone Number? [phone]  ---------------------------------------------------------------------------  --------------  SCRIPT ANSWERS  Relationship to Patient?  Self

## 2022-06-21 ENCOUNTER — TELEPHONE (OUTPATIENT)
Dept: FAMILY MEDICINE CLINIC | Age: 57
End: 2022-06-21

## 2022-06-21 DIAGNOSIS — F98.8 ATTENTION DEFICIT DISORDER, UNSPECIFIED HYPERACTIVITY PRESENCE: ICD-10-CM

## 2022-06-21 DIAGNOSIS — R09.82 POSTNASAL DRIP: ICD-10-CM

## 2022-06-21 DIAGNOSIS — J30.2 SEASONAL ALLERGIES: ICD-10-CM

## 2022-06-21 DIAGNOSIS — K21.9 GASTROESOPHAGEAL REFLUX DISEASE WITHOUT ESOPHAGITIS: ICD-10-CM

## 2022-06-21 RX ORDER — PANTOPRAZOLE SODIUM 40 MG/1
40 TABLET, DELAYED RELEASE ORAL
Qty: 30 TABLET | Refills: 5 | Status: SHIPPED | OUTPATIENT
Start: 2022-06-21

## 2022-06-21 RX ORDER — FLUTICASONE PROPIONATE 50 MCG
2 SPRAY, SUSPENSION (ML) NASAL DAILY
Qty: 16 G | Refills: 2 | Status: SHIPPED | OUTPATIENT
Start: 2022-06-21

## 2022-06-21 NOTE — TELEPHONE ENCOUNTER
----- Message from [facility] sent at 6/21/2022  8:54 AM EDT -----  Subject: Refill Request    QUESTIONS  Name of Medication? lisdexamfetamine (VYVANSE) 30 MG capsule  Patient-reported dosage and instructions? Take 1 capsule by mouth every   morning for 30 days. How many days do you have left? 2  Preferred Pharmacy? [facility] [id number]  Pharmacy phone number (if available)? [phone]  ---------------------------------------------------------------------------  --------------,  Name of Medication? fluticasone (FLONASE) 50 MCG/ACT nasal spray  Patient-reported dosage and instructions? 2 sprays by Each Nostril route   daily  How many days do you have left? 0  Preferred Pharmacy? [facility] [id number]  Pharmacy phone number (if available)? [phone]  ---------------------------------------------------------------------------  --------------,  Name of Medication? pantoprazole (PROTONIX) 40 MG tablet  Patient-reported dosage and instructions? Take 1 tablet by mouth every   morning (before breakfast)  How many days do you have left? 0  Preferred Pharmacy? [facility] [id number]  Pharmacy phone number (if available)? [phone]  Additional Information for Provider? Patient would like to make sure that   the doctor fill out the paperwork properly and she said to do box 5 the   leave of absence and return date. She said she would like to start the   process of her returning to work.  ---------------------------------------------------------------------------  --------------  [address]  What is the best way for the office to contact you? OK to leave message on   voicemail  Preferred Call Back Phone Number? [phone]  ---------------------------------------------------------------------------  --------------  SCRIPT ANSWERS  Relationship to Patient?  Self

## 2022-06-21 NOTE — TELEPHONE ENCOUNTER
Medication:   Requested Prescriptions     Pending Prescriptions Disp Refills    lisdexamfetamine (VYVANSE) 30 MG capsule 30 capsule 0     Sig: Take 1 capsule by mouth every morning for 30 days.  fluticasone (FLONASE) 50 MCG/ACT nasal spray 16 g 2     Si sprays by Each Nostril route daily    pantoprazole (PROTONIX) 40 MG tablet 30 tablet 5     Sig: Take 1 tablet by mouth every morning (before breakfast)        Last Filled:  22 // 3/31/22 with 2 refills //21 with 5 refills    Patient Phone Number: [phone] (home)     Last appt: 2022   Next appt: 2022    Last OARRS:   RX Monitoring 2022   Attestation -   Periodic Controlled Substance Monitoring No signs of potential drug abuse or diversion identified.

## 2022-06-27 ENCOUNTER — TELEPHONE (OUTPATIENT)
Dept: FAMILY MEDICINE CLINIC | Age: 57
End: 2022-06-27

## 2022-06-27 NOTE — TELEPHONE ENCOUNTER
----- Message from Stacey Rose sent at 6/27/2022  3:02 PM EDT -----  Subject: Message to Provider    QUESTIONS  Information for Provider? Patient stated that her employer hasn't received   the paperwork for her medical leave that was completed by her PCP. She   stated that the Antonio Nava will be faxing over paperwork today for her to   return to work.  ---------------------------------------------------------------------------  --------------  [address]  What is the best way for the office to contact you? OK to leave message on   voicemail  Preferred Call Back Phone Number? [phone]  ---------------------------------------------------------------------------  --------------  SCRIPT ANSWERS  Relationship to Patient?  Self

## 2022-07-05 ENCOUNTER — TELEPHONE (OUTPATIENT)
Dept: FAMILY MEDICINE CLINIC | Age: 57
End: 2022-07-05

## 2022-07-05 NOTE — TELEPHONE ENCOUNTER
----- Message from Holly Obando sent at 7/5/2022  3:02 PM EDT -----  Subject: Message to Provider    QUESTIONS  Information for Provider? Patient would like a call back to find out if   Dr. Carver Seip has filled out her return to work form and if it has the correct   dates on it.  ---------------------------------------------------------------------------  --------------  [address]  [phone]; OK to leave message on voicemail  ---------------------------------------------------------------------------  --------------  SCRIPT ANSWERS  Relationship to Patient?  Self

## 2022-07-12 ENCOUNTER — OFFICE VISIT (OUTPATIENT)
Dept: FAMILY MEDICINE CLINIC | Age: 57
End: 2022-07-12
Payer: COMMERCIAL

## 2022-07-12 VITALS
OXYGEN SATURATION: 98 % | BODY MASS INDEX: 24.45 KG/M2 | HEIGHT: 63 IN | DIASTOLIC BLOOD PRESSURE: 74 MMHG | HEART RATE: 84 BPM | WEIGHT: 138 LBS | SYSTOLIC BLOOD PRESSURE: 112 MMHG

## 2022-07-12 DIAGNOSIS — F41.9 ANXIETY AND DEPRESSION: Primary | ICD-10-CM

## 2022-07-12 DIAGNOSIS — F32.A ANXIETY AND DEPRESSION: Primary | ICD-10-CM

## 2022-07-12 PROCEDURE — 3017F COLORECTAL CA SCREEN DOC REV: CPT | Performed by: NURSE PRACTITIONER

## 2022-07-12 PROCEDURE — 4004F PT TOBACCO SCREEN RCVD TLK: CPT | Performed by: NURSE PRACTITIONER

## 2022-07-12 PROCEDURE — G8427 DOCREV CUR MEDS BY ELIG CLIN: HCPCS | Performed by: NURSE PRACTITIONER

## 2022-07-12 PROCEDURE — G8420 CALC BMI NORM PARAMETERS: HCPCS | Performed by: NURSE PRACTITIONER

## 2022-07-12 PROCEDURE — 99213 OFFICE O/P EST LOW 20 MIN: CPT | Performed by: NURSE PRACTITIONER

## 2022-07-12 RX ORDER — SERTRALINE HYDROCHLORIDE 25 MG/1
25 TABLET, FILM COATED ORAL DAILY
Qty: 90 TABLET | Refills: 0 | Status: SHIPPED | OUTPATIENT
Start: 2022-07-12

## 2022-07-12 ASSESSMENT — ENCOUNTER SYMPTOMS: RESPIRATORY NEGATIVE: 1

## 2022-07-12 NOTE — PROGRESS NOTES
2022     Sandi Harada (:  1965) is a 64 y.o. female, here for evaluation of the following medical concerns:    Chief Complaint   Patient presents with    Forms     work release paperwork for 2022     Anxiety and Depression:  Patient is wanting to be released back to work. She states she has several payments due coming up and she needs to return back to work. She is still having some anxiety and depression but feels she is able to work and manage her symptoms. Review of Systems   Constitutional: Negative. Respiratory: Negative. Cardiovascular: Negative. Neurological: Negative. Psychiatric/Behavioral: Positive for dysphoric mood. Negative for self-injury, sleep disturbance and suicidal ideas. The patient is nervous/anxious. Prior to Visit Medications    Medication Sig Taking? Authorizing Provider   sertraline (ZOLOFT) 25 MG tablet Take 1 tablet by mouth daily Yes ANDREW Coppola CNP   lisdexamfetamine (VYVANSE) 30 MG capsule Take 1 capsule by mouth every morning for 30 days.  Yes Paul Ramírez MD   fluticasone [facility]) 50 MCG/ACT nasal spray 2 sprays by Each Nostril route daily Yes Paul Ramírez MD   pantoprazole (PROTONIX) 40 MG tablet Take 1 tablet by mouth every morning (before breakfast) Yes Paul Ramírez MD   loratadine (CLARITIN) 10 MG tablet Take 1 tablet by mouth daily Yes Genevieve Tavarez MD   cyclobenzaprine (FLEXERIL) 10 MG tablet Take 1 tablet by mouth 3 times daily as needed for Muscle spasms Yes Peña Whitten MD   ibuprofen (ADVIL;MOTRIN) 800 MG tablet Take 1 tablet by mouth 3 times daily (with meals) Yes ANDREW Coppola CNP   ondansetron (ZOFRAN-ODT) 4 MG disintegrating tablet Take 1 tablet by mouth 3 times daily as needed for Nausea or Vomiting Yes ANDREW Coppola CNP   lidocaine (LMX) 4 % cream Apply topically as needed bid Yes Paul Ramírez MD   albuterol sulfate HFA (PROAIR HFA) 108 (90 Base) MCG/ACT inhaler Inhale 2 puffs into the lungs every 4 hours as needed for Wheezing Yes Sona Prasad MD   Promethazine-Phenylephrine [facility]) 6.25-5 MG/5ML syrup Take 5 mLs by mouth every 6 hours Yes Saintclair LeeksANDREW - CNP   naproxen (NAPROSYN) 500 MG tablet Take 1 tablet by mouth 2 times daily (with meals) for 21 days  Alisia Brunson MD        Social History     Tobacco Use    Smoking status: Current Every Day Smoker     Packs/day: 0.25     Years: 10.00     Pack years: 2.50     Types: Cigarettes    Smokeless tobacco: Never Used   Vaping Use    Vaping Use: Never used   Substance Use Topics    Alcohol use: Yes     Alcohol/week: 0.0 standard drinks     Comment: occ    Drug use: Yes     Types: Marijuana (Weed)        Vitals:    07/12/22 0856   BP: 112/74   Pulse: 84   SpO2: 98%   Weight: 138 lb (62.6 kg)   Height: 5' 3\" (1.6 m)     Estimated body mass index is 24.45 kg/m² as calculated from the following:    Height as of this encounter: 5' 3\" (1.6 m). Weight as of this encounter: 138 lb (62.6 kg). Physical Exam  Vitals and nursing note reviewed. Constitutional:       General: She is not in acute distress. Appearance: Normal appearance. She is normal weight. She is not ill-appearing. Cardiovascular:      Rate and Rhythm: Normal rate and regular rhythm. Heart sounds: Normal heart sounds, S1 normal and S2 normal. No murmur heard. Pulmonary:      Effort: Pulmonary effort is normal.      Breath sounds: Normal breath sounds and air entry. Skin:     General: Skin is warm and dry. Capillary Refill: Capillary refill takes less than 2 seconds. Neurological:      General: No focal deficit present. Mental Status: She is alert and oriented to person, place, and time. Psychiatric:         Attention and Perception: Attention normal.         Mood and Affect: Mood is depressed. Affect is tearful. Speech: Speech normal.         Behavior: Behavior normal. Behavior is cooperative. Thought Content:  Thought content normal. Thought content does not include homicidal or suicidal ideation. Thought content does not include homicidal or suicidal plan. Judgment: Judgment normal.         ASSESSMENT/PLAN:  1. Anxiety and depression  Letter given to patient to return back to work  Discussed medication assistance with Zoloft, patient agrees. Denies suicidal/homicidal ideations  Recommend patient see a counselor, offered options  - sertraline (ZOLOFT) 25 MG tablet; Take 1 tablet by mouth daily  Dispense: 90 tablet; Refill: 0    Return in about 4 weeks (around 8/9/2022), or if symptoms worsen or fail to improve.

## 2022-07-12 NOTE — PATIENT INSTRUCTIONS
1. Psychiatry  [facility]  ? Most major insurances accepted, including Medicare and Medicaid  - [place] Location  ? [address] [place], [address] [facility] Location  ? [address] [facility] [address]  ? Phone: [phone] [facility] Scheduling)  ? PFI Acquisition   [facility]  ? Most major insurances accepted  ? [address] [place] [facility]. [address]  ? Phone: [phone]  ? [email or web address] [email or web address]   [facility] (Formerly PsychBC)  ? Most insurances accepted  ? Many locations  ? Recommend scheduling online due to long wait times for calls  ? Phone: [phone]  ? [email or web address]  ? FameBit/us/psychiatrists/oh/[place]  - May utilize the website and filter by location and insurance coverage  2. Psychotherapy  ? [facility] Employees (Psychotherapy or Psychiatry)  Coleman Lynn (Formerly PsychBC)  ? Most insurances accepted  ? Many locations  ? Recommend scheduling online due to long wait times for calls  ? Phone: [phone]  ? https://Immy  ? FameBit/us/psychiatrists/oh/[facility]  - May utilize the website and filter by location and insurance coverage  ? Private Insurance/Self-Pay  Oscar Hercules  ? Accepts Medicare/Medicaid, most private insurance  ? Presybeterian Psychologist  ? For more information, email [email or web address]  ? [id number] [facility] [place], [address]  ? Phone: [phone]  ? CoachingBuilder.es   [facility]  ? Most insurances accepted, including Medicare/Medicaid  ? Two Locations  - [address] [place] [facility], . [address]  ? [email or web address]. [email or web address]  - [address] [facility] [place], [address]  ? [email or web address]  ? Phone: [phone]  ? GiftContent.is. [employer] Entertainment   [facility] [place]  ?  Most insurances accepted  ? [address] [place], [place]  ? Phone: [phone]  ? BridgeDigest.is  [address]  ? Individual, couple, and group counseling   ? Does not bill insurance  ? Can provide you with bills that you can submit to your insurance to try to obtain reimbursement of costs  ? $150-$200 per hour  ? [address] [place], [address]  ? Phone: [phone]  ? http://www.Medical Metrx Solutions. com   [facility]  ? Most insurances accepted, including Medicare/Medicaid, Caresource and [place] Advantage  ? For more information, email Urvashi@Purplu  ? [address] [facility] [address] [facility] [place], [address]  ? Phone: [phone]  ? https://[email or web address]. WhatClinic.comLittle Colorado Medical Center.Heyo   [facility]  ? Does not accept insurance; Private Pay only  ? For more information, email Familia@Banro Corporation. He[place]  ? Two Locations  - [facility]., [place], [address]  ? Phone: [phone]  ? Nex3 Communications   [facility]  ? Private Pay only; $110-$200 per session  ? For more information, email [email or web address]. [place]  ? [address], [address] [place] , [place]ares[place]  ? Phone: [phone]  ? Welcare   [address]  ? Not contracted with any insurance companies  ? Private Pay only  ? Out of [address] providers  ? [address]  ? Phone: [phone]  ? Yeke Network Radio.paOnde/   [facility]  ? Accepts many private insurance plans  ? For more information, email Roney@[email or web address]. com  ? [address]  ? [address], [place]ha [place], [address]  ? [address] [place], [address] [place] [address]  ?  [address]., [facility] [address] Phone: [phone]  ? MyDisguise.cz. com   [facility]  ? Accepts many insurance policies, Medicaid/Medicare  ? Offers self-pay discounts  ? Many locations across [place] and [place]  ? For more information, email Maykel@Cloud Imperium Games  ? Phone: (992) [phone]  ? B-hive Networks.Minilogs.Bensussen Deutsch. com  [address]  ? No mention of insurance acceptance on website  ? [address] Veronika ORTIZ, [place], [address]  ? No phone contact, must schedule online  ? https://www.Bio-Intervention [email or web address]/. com  [facility]  ? Most major insurances accepted, including Medicare and Medicaid  - [place] Location  ? [address] [facility] [place], [address] Location  ? [address] [facility]  ? Phone: [phone] [facility] Scheduling)  ? Tourlandish. Minilogs   Life Made Conscious  ? Likely will be out-of-network provider  ? Costs range from $100-$350  ? For more information, email [email or web address]. com  ? [address] [place], [address]  ? Phone: [phone]  ? Harir.The Trade Desk. Minilogs  [address]  ? Accepts most insurances, including Medicaid/Medicare  ? Three Locations  - [place]  ? [address] [place], [facility] [address]  ? [address] [place]., [address] [place], [address]  ? [address] [facility] [address] [facility] [address] [facility] [address] [place], [address] [place]  ? Phone: [phone]  ? RecordDebt.Mimeo  [address]  ? Accepts most insurances, including Medicaid/Medicare  ? [address] [place], [facility]  ? Phone: [phone]  ? https://TopVisible/. geneva Alonso Weeks  ? Accepts many private insurances, including Medicaid/Medicare  ? [address] [facility] Shakeel Monge, [address]  ? Phone: [phone]  ? Clutter.Fitbit. com   Candice Best  ? Accepts private insurances, including Medicare  ?  For more information, email Gauri@[email or web address].la. com  ? [address] [place], [address]  ? Phone: [phone]  ? Systems Maintenance Services   Juve Billingsley  ? Accepts Workman's Comp, many private insurances, including Medicare  ? [address] [facility] THE [facility] AT [facility]  ? Phone: [phone]  ? [email or web address] com/provider/[email or web address]-qybmmfbk-mmzp-oh/

## 2022-07-12 NOTE — LETTER
[address]  Phone: [phone]  Fax: [phone]    ANDREW Olivera CNP        July 12, 2022     Patient: Sam Quiroga   YOB: 1965   Date of Visit: 7/12/2022       To Whom It May Concern: It is my medical opinion that Sam Quiroga may return to full duty immediately with no restrictions. If you have any questions or concerns, please don't hesitate to call.     Sincerely,        ANDREW Olivera CNP

## 2022-07-14 ENCOUNTER — TELEPHONE (OUTPATIENT)
Dept: FAMILY MEDICINE CLINIC | Age: 57
End: 2022-07-14

## 2022-07-14 NOTE — LETTER
[address]  Phone: [phone]  Fax: [phone]    Issac Lo MD        July 14, 2022     Patient: Rinku Mueller   YOB: 1965   Date of Visit: 7/14/2022       To Whom It May Concern: It is my medical opinion that Rinku Mueller may return to work on 7/18/22 with no restrictions. If you have any questions or concerns, please don't hesitate to call.     Sincerely,        Issac Lo MD

## 2022-07-14 NOTE — TELEPHONE ENCOUNTER
The patient calls in to request a return to work letter. The patient would like the return to work with no restrictions date of 7/18/2022. The patient would like to be notified when she can  the letter at the .

## 2022-07-19 ENCOUNTER — TELEPHONE (OUTPATIENT)
Dept: FAMILY MEDICINE CLINIC | Age: 57
End: 2022-07-19

## 2022-07-19 NOTE — TELEPHONE ENCOUNTER
Ama Cisneros thought she already went back to work and you filled out her papers. You may have to call her and find out what is required on this new form. If none of the other providers will sign this, she may have to wait until I return to the office.

## 2022-07-19 NOTE — TELEPHONE ENCOUNTER
----- Message from Timothy Mossjesenia sent at 7/19/2022  9:10 AM EDT -----  Subject: Message to Provider    QUESTIONS  Information for Provider? patient says she is sending a fax today of a   release to go back to work. Please have her provider fill it out. Please   fax it back to the number on the form. ---------------------------------------------------------------------------  --------------  Tatiana HuiNorberto JQZG  [phone]; OK to leave message on voicemail  ---------------------------------------------------------------------------  --------------  SCRIPT ANSWERS  Relationship to Patient?  Self

## 2022-07-20 DIAGNOSIS — F98.8 ATTENTION DEFICIT DISORDER, UNSPECIFIED HYPERACTIVITY PRESENCE: ICD-10-CM

## 2022-07-20 NOTE — TELEPHONE ENCOUNTER
Medication: Vyvanse   Requested Prescriptions     Pending Prescriptions Disp Refills    lisdexamfetamine (VYVANSE) 30 MG capsule 30 capsule 0     Sig: Take 1 capsule by mouth every morning for 30 days. Last Filled: 6/21/2022     Patient Phone Number: [phone] (home)     Last appt: 7/12/2022   Next appt: Visit date not found    Last OARRS:   RX Monitoring 2/18/2022   Attestation -   Periodic Controlled Substance Monitoring No signs of potential drug abuse or diversion identified.

## 2022-07-20 NOTE — TELEPHONE ENCOUNTER
Medication and Quantity requested: lisdexamfetamine (VYVANSE) 30 MG capsule       Last Visit  7/12/2022    Pharmacy and phone number updated in EPIC:  yes    Jolly Ky #[id number]  [facility]

## 2022-07-21 ENCOUNTER — TELEPHONE (OUTPATIENT)
Dept: FAMILY MEDICINE CLINIC | Age: 57
End: 2022-07-21

## 2022-07-21 NOTE — TELEPHONE ENCOUNTER
Call to [facility] for clarity on what to provide   The patient needs a fitness for duty form to return to work [facility] will fax the form to the office

## 2022-07-21 NOTE — TELEPHONE ENCOUNTER
Patient is asking that the doctor note be sent to [facility]- [phone]  Patient states she just spoke w/Quynh re: the note for work  Any questions, contact patient

## 2022-09-27 DIAGNOSIS — F98.8 ATTENTION DEFICIT DISORDER, UNSPECIFIED HYPERACTIVITY PRESENCE: ICD-10-CM

## 2022-09-27 NOTE — TELEPHONE ENCOUNTER
Medication:   Requested Prescriptions     Pending Prescriptions Disp Refills    lisdexamfetamine (VYVANSE) 30 MG capsule 30 capsule 0     Sig: Take 1 capsule by mouth every morning for 30 days. Last Filled:  30 x 0 Rf 7/20/22    Patient Phone Number: [phone] (home)     Last appt: 7/12/2022   Next appt: Visit date not found    Last OARRS:   RX Monitoring 2/18/2022   Attestation -   Periodic Controlled Substance Monitoring No signs of potential drug abuse or diversion identified.

## 2022-09-27 NOTE — TELEPHONE ENCOUNTER
Medication and Quantity requested:   lisdexamfetamine (VYVANSE) 30 MG capsule  QTY: 30     Last Visit: 07/12/2022 [place] and phone number updated in EPIC:  Connie Rodrigues

## 2022-10-12 ENCOUNTER — TELEPHONE (OUTPATIENT)
Dept: FAMILY MEDICINE CLINIC | Age: 57
End: 2022-10-12

## 2022-10-12 ENCOUNTER — OFFICE VISIT (OUTPATIENT)
Dept: FAMILY MEDICINE CLINIC | Age: 57
End: 2022-10-12
Payer: COMMERCIAL

## 2022-10-12 ENCOUNTER — NURSE TRIAGE (OUTPATIENT)
Dept: OTHER | Facility: CLINIC | Age: 57
End: 2022-10-12

## 2022-10-12 VITALS
SYSTOLIC BLOOD PRESSURE: 128 MMHG | RESPIRATION RATE: 16 BRPM | HEIGHT: 63 IN | BODY MASS INDEX: 25.05 KG/M2 | WEIGHT: 141.4 LBS | DIASTOLIC BLOOD PRESSURE: 83 MMHG | HEART RATE: 84 BPM | OXYGEN SATURATION: 98 %

## 2022-10-12 DIAGNOSIS — F43.23 ADJUSTMENT DISORDER WITH MIXED ANXIETY AND DEPRESSED MOOD: ICD-10-CM

## 2022-10-12 PROCEDURE — G8427 DOCREV CUR MEDS BY ELIG CLIN: HCPCS | Performed by: FAMILY MEDICINE

## 2022-10-12 PROCEDURE — 3017F COLORECTAL CA SCREEN DOC REV: CPT | Performed by: FAMILY MEDICINE

## 2022-10-12 PROCEDURE — 99213 OFFICE O/P EST LOW 20 MIN: CPT | Performed by: FAMILY MEDICINE

## 2022-10-12 PROCEDURE — G8484 FLU IMMUNIZE NO ADMIN: HCPCS | Performed by: FAMILY MEDICINE

## 2022-10-12 PROCEDURE — 4004F PT TOBACCO SCREEN RCVD TLK: CPT | Performed by: FAMILY MEDICINE

## 2022-10-12 PROCEDURE — G8419 CALC BMI OUT NRM PARAM NOF/U: HCPCS | Performed by: FAMILY MEDICINE

## 2022-10-12 ASSESSMENT — PATIENT HEALTH QUESTIONNAIRE - PHQ9
10. IF YOU CHECKED OFF ANY PROBLEMS, HOW DIFFICULT HAVE THESE PROBLEMS MADE IT FOR YOU TO DO YOUR WORK, TAKE CARE OF THINGS AT HOME, OR GET ALONG WITH OTHER PEOPLE: 3
3. TROUBLE FALLING OR STAYING ASLEEP: 2
SUM OF ALL RESPONSES TO PHQ QUESTIONS 1-9: 20
7. TROUBLE CONCENTRATING ON THINGS, SUCH AS READING THE NEWSPAPER OR WATCHING TELEVISION: 3
2. FEELING DOWN, DEPRESSED OR HOPELESS: 3
1. LITTLE INTEREST OR PLEASURE IN DOING THINGS: 0
5. POOR APPETITE OR OVEREATING: 3
SUM OF ALL RESPONSES TO PHQ QUESTIONS 1-9: 20
SUM OF ALL RESPONSES TO PHQ9 QUESTIONS 1 & 2: 3
SUM OF ALL RESPONSES TO PHQ QUESTIONS 1-9: 20
4. FEELING TIRED OR HAVING LITTLE ENERGY: 3
8. MOVING OR SPEAKING SO SLOWLY THAT OTHER PEOPLE COULD HAVE NOTICED. OR THE OPPOSITE, BEING SO FIGETY OR RESTLESS THAT YOU HAVE BEEN MOVING AROUND A LOT MORE THAN USUAL: 3
9. THOUGHTS THAT YOU WOULD BE BETTER OFF DEAD, OR OF HURTING YOURSELF: 0
6. FEELING BAD ABOUT YOURSELF - OR THAT YOU ARE A FAILURE OR HAVE LET YOURSELF OR YOUR FAMILY DOWN: 3
SUM OF ALL RESPONSES TO PHQ QUESTIONS 1-9: 20

## 2022-10-12 ASSESSMENT — COLUMBIA-SUICIDE SEVERITY RATING SCALE - C-SSRS
2. HAVE YOU ACTUALLY HAD ANY THOUGHTS OF KILLING YOURSELF?: NO
1. WITHIN THE PAST MONTH, HAVE YOU WISHED YOU WERE DEAD OR WISHED YOU COULD GO TO SLEEP AND NOT WAKE UP?: YES
6. HAVE YOU EVER DONE ANYTHING, STARTED TO DO ANYTHING, OR PREPARED TO DO ANYTHING TO END YOUR LIFE?: NO

## 2022-10-12 NOTE — TELEPHONE ENCOUNTER
Patient calling,   States that are having extreme dizziness, light headed and nausea. No other symptoms. Patient is asking to be seen today and I do not see any open slots. Does anyone have any time to squeeze patient in today?

## 2022-10-12 NOTE — TELEPHONE ENCOUNTER
Location of patient: [address] call from Alpha at [facility] with [employer]. Subjective: Caller states \"I have been feeling lightheaded and dizzy\"     Current Symptoms: Nausea. Symptoms get worse with her anxiety. Runny nose. Fells faint at times, has not passed out. No dizziness now or today. No SOB or chest pain. Missed work the past 2 days. Has not been feeling well in general and \"sluggish\". Onset: a few weeks ago; worsening, waxing and waning    Associated Symptoms: reduced activity    Pain Severity: 0/10; N/A; none    Temperature: denies fever     What has been tried:     LMP: NA Pregnant: NA    Recommended disposition: Go to ED/UCC Now (Or to Office with PCP Approval)    Care advice provided, patient verbalizes understanding; denies any other questions or concerns; instructed to call back for any new or worsening symptoms. Writer provided warm transfer to [facility] at [address] [place] [address] for second level triage. Attention Provider: Thank you for allowing me to participate in the care of your patient. The patient was connected to triage in response to information provided to the ECC/PSC. Please do not respond through this encounter as the response is not directed to a shared pool.     Reason for Disposition   Patient sounds very sick or weak to the triager    Protocols used: Dizziness-ADULT-OH

## 2022-10-12 NOTE — PROGRESS NOTES
Bella Alfred is a 62 y.o. female. HPI:    Not feeling well , stressed, lives with an alcoholic, worried about her children    Tearful today cannot stop crying    Meds, vitamins and allergies reviewed with pt    Wt Readings from Last 3 Encounters:   10/12/22 141 lb 6.4 oz (64.1 kg)   07/12/22 138 lb (62.6 kg)   06/16/22 142 lb 9.6 oz (64.7 kg)       REVIEW OF SYSTEMS:   CONSTITUTIONAL: See history of present illness,   Weight noted   HEENT: No new vision difficulties or ringing in the ears. RESPIRATORY: No new SOB, PND, orthopnea or cough. CARDIOVASCULAR: no CP, palpitations or SOB with exertion  GI: No nausea, vomiting, diarrhea, constipation, abdominal pain or changes in bowel habits. : No urinary frequency, urgency, incontinence hematuria or dysuria. SKIN: No cyanosis or skin lesions. MUSCULOSKELETAL: No new muscle or joint pain. NEUROLOGICAL: No syncope or TIA-like symptoms. PSYCHIATRIC: No anxiety, insomnia or depression     Allergies   Allergen Reactions    Pcn [Penicillins] Other (See Comments)       Prior to Visit Medications    Medication Sig Taking? Authorizing Provider   sertraline (ZOLOFT) 50 MG tablet Take 1 tablet by mouth Daily with supper Yes Wilian Trinh MD   lisdexamfetamine (VYVANSE) 30 MG capsule Take 1 capsule by mouth every morning for 30 days.  Yes Fransico Medley MD   sertraline (ZOLOFT) 25 MG tablet Take 1 tablet by mouth daily Yes ANDREW Wilkinson - CNP   fluticasone (FLONASE) 50 MCG/ACT nasal spray 2 sprays by Each Nostril route daily Yes Fransico Medley MD   pantoprazole (PROTONIX) 40 MG tablet Take 1 tablet by mouth every morning (before breakfast) Yes Fransico Medley MD   loratadine (CLARITIN) 10 MG tablet Take 1 tablet by mouth daily Yes Wilian Trinh MD   cyclobenzaprine (FLEXERIL) 10 MG tablet Take 1 tablet by mouth 3 times daily as needed for Muscle spasms Yes Ant Gaviria MD   ibuprofen (ADVIL;MOTRIN) 800 MG tablet Take 1 tablet by mouth 3 times daily (with meals) Yes Caralee Gum, APRN - CNP   ondansetron (ZOFRAN-ODT) 4 MG disintegrating tablet Take 1 tablet by mouth 3 times daily as needed for Nausea or Vomiting Yes Caralee Gum, APRN - CNP   lidocaine (LMX) 4 % cream Apply topically as needed bid Yes Shantell Thakkar MD   albuterol sulfate HFA (PROAIR HFA) 108 (90 Base) MCG/ACT inhaler Inhale 2 puffs into the lungs every 4 hours as needed for Wheezing Yes Shantell Thakkar MD   Promethazine-Phenylephrine [facility]) 6.25-5 MG/5ML syrup Take 5 mLs by mouth every 6 hours Yes ANDREW Brunner - CNP   naproxen (NAPROSYN) 500 MG tablet Take 1 tablet by mouth 2 times daily (with meals) for 21 days  Diana Craig MD       Past Medical History:   Diagnosis Date    ADD (attention deficit disorder) 3/4/2016       Social History     Tobacco Use    Smoking status: Every Day     Packs/day: 0.25     Years: 10.00     Pack years: 2.50     Types: Cigarettes    Smokeless tobacco: Never   Substance Use Topics    Alcohol use: Yes     Alcohol/week: 0.0 standard drinks     Comment: occ       Family History   Problem Relation Age of Onset    Diabetes Father        OBJECTIVE:  /83 (Site: Left Upper Arm, Position: Sitting, Cuff Size: Small Adult)   Pulse 84   Resp 16   Ht 5' 3\" (1.6 m)   Wt 141 lb 6.4 oz (64.1 kg)   LMP 01/24/2019   SpO2 98%   BMI 25.05 kg/m²   GEN: Very tearful today  HEENT:  NCAT  NECK:  Supple without adenopathy. CV:  Regular rate and rhythm, S1 and S2 normal, no murmurs, clicks  PULM:  Chest is clear, no wheezing ,  symmetric air entry throughout both lung fields. ABD: Soft, NT, no masses appreciated  EXT: No rash or edema  NEURO: Alert oriented ×3, nonfocal, no assistive device    COTY-7=18    PHQ-9=14    ASSESSMENT/PLAN:  1.  Adjustment disorder with mixed anxiety and depressed mood  Start Zoloft 25 mg in the evening after dinner  Increase to 50 mg in 5 to 7 days if tolerating 25 mg with dinner  Close follow-up with PCP  Healthy diet, stay active, vitamin D daily    Recommend counseling either through insurance or call [phone]    25 Total Minutes spent pre charting (reviewing problem list, meds, any test results, consultant and hospital notes ) and  obtaining present visit history, performing appropriate medical exam/evaluation, counseling and educating the patient (and family), ordering medications ,tests, and procedures as needed, refilling medication(s), placing referral(s) when needed in addition to coordinating care for this patient and documenting in electronic health record

## 2022-10-14 ENCOUNTER — TELEPHONE (OUTPATIENT)
Dept: FAMILY MEDICINE CLINIC | Age: 57
End: 2022-10-14

## 2022-10-14 NOTE — LETTER
[address]  Phone: [phone]  Fax: [phone]    Geno Arenas      October 12, 2022     Patient: Rancho Waite   YOB: 1965   Date of Visit: 10/12/2022       To Whom It May Concern: It is my medical opinion that Rancho Waite may be excused from October 11th - 15th. She may return on October 17th, 2022. If you have any questions or concerns, please don't hesitate to call.     Sincerely,        Lino Copeland MD

## 2022-10-14 NOTE — TELEPHONE ENCOUNTER
Pt would like work excuse to be extended from Tuesday 10/12/2022 -Saturday 10/15/2022 and have pt return to work on Monday 10/17/2022. Pt would like call once letter it is ready to be picked up.

## 2022-10-14 NOTE — TELEPHONE ENCOUNTER
Patient will need to  letter for work for Monday     She would like a call when letter is ready and signed

## 2022-10-20 DIAGNOSIS — F98.8 ATTENTION DEFICIT DISORDER, UNSPECIFIED HYPERACTIVITY PRESENCE: ICD-10-CM

## 2022-10-20 NOTE — TELEPHONE ENCOUNTER
----- Message from [employer] sent at 10/20/2022  4:50 PM EDT -----  Subject: Refill Request    QUESTIONS  Name of Medication? lisdexamfetamine (VYVANSE) 30 MG capsule  Patient-reported dosage and instructions? once a day  How many days do you have left? 0  Preferred Pharmacy? [facility] [id number]  Pharmacy phone number (if available)? [phone]  ---------------------------------------------------------------------------  --------------  Sumaya MURRY  What is the best way for the office to contact you? OK to leave message on   voicemail  Preferred Call Back Phone Number? [phone]  ---------------------------------------------------------------------------  --------------  SCRIPT ANSWERS  Relationship to Patient?  Self

## 2022-10-20 NOTE — TELEPHONE ENCOUNTER
Medication:   Requested Prescriptions     Pending Prescriptions Disp Refills    lisdexamfetamine (VYVANSE) 30 MG capsule 30 capsule 0     Sig: Take 1 capsule by mouth every morning for 30 days. Last Filled:  9/27/2022, 30, 0    Patient Phone Number: [phone] (home)     Last appt: 10/12/2022   Next appt: Visit date not found    Last OARRS:   RX Monitoring 2/18/2022   Attestation -   Periodic Controlled Substance Monitoring No signs of potential drug abuse or diversion identified.

## 2022-10-21 ENCOUNTER — TELEPHONE (OUTPATIENT)
Dept: FAMILY MEDICINE CLINIC | Age: 57
End: 2022-10-21

## 2022-10-21 NOTE — TELEPHONE ENCOUNTER
----- Message from [employer] sent at 10/20/2022  4:50 PM EDT -----  Subject: Refill Request    QUESTIONS  Name of Medication? lisdexamfetamine (VYVANSE) 30 MG capsule  Patient-reported dosage and instructions? once a day  How many days do you have left? 0  Preferred Pharmacy? [facility] [id number]  Pharmacy phone number (if available)? [phone]  ---------------------------------------------------------------------------  --------------  Zora Bosworth INFO  What is the best way for the office to contact you? OK to leave message on   voicemail  Preferred Call Back Phone Number? [phone]  ---------------------------------------------------------------------------  --------------  SCRIPT ANSWERS  Relationship to Patient?  Self

## 2022-11-22 DIAGNOSIS — F98.8 ATTENTION DEFICIT DISORDER, UNSPECIFIED HYPERACTIVITY PRESENCE: ICD-10-CM

## 2022-11-22 DIAGNOSIS — K21.9 GASTROESOPHAGEAL REFLUX DISEASE WITHOUT ESOPHAGITIS: ICD-10-CM

## 2022-11-22 RX ORDER — PANTOPRAZOLE SODIUM 40 MG/1
40 TABLET, DELAYED RELEASE ORAL
Qty: 30 TABLET | Refills: 5 | Status: SHIPPED | OUTPATIENT
Start: 2022-11-22

## 2022-11-22 NOTE — TELEPHONE ENCOUNTER
----- Message from Valentino Hidden sent at 11/22/2022 12:40 PM EST -----  Subject: Refill Request    QUESTIONS  Name of Medication? lisdexamfetamine (VYVANSE) 30 MG capsule  Patient-reported dosage and instructions? Take 1 capsule by mouth every   morning for 30 days. How many days do you have left? 5  Preferred Pharmacy? [facility] [id number]  Pharmacy phone number (if available)? [phone]  ---------------------------------------------------------------------------  --------------,  Name of Medication? pantoprazole (PROTONIX) 40 MG tablet  Patient-reported dosage and instructions? Take 1 tablet by mouth every   morning (before breakfast)  How many days do you have left? 0  Preferred Pharmacy? [facility] [id number]  Pharmacy phone number (if available)? [phone]  ---------------------------------------------------------------------------  --------------  Leta MURRY  What is the best way for the office to contact you? OK to leave message on   voicemail  Preferred Call Back Phone Number? [phone]  ---------------------------------------------------------------------------  --------------  SCRIPT ANSWERS  Relationship to Patient?  Self

## 2022-11-22 NOTE — TELEPHONE ENCOUNTER
Medication:   Requested Prescriptions     Pending Prescriptions Disp Refills    lisdexamfetamine (VYVANSE) 30 MG capsule 30 capsule 0     Sig: Take 1 capsule by mouth every morning for 30 days. pantoprazole (PROTONIX) 40 MG tablet 30 tablet 5     Sig: Take 1 tablet by mouth every morning (before breakfast)        Last Filled:  10/20/2022 (vyvanse) 6/21/2022 (protonix)    Patient Phone Number: [phone] (home)     Last appt: 10/12/2022   Next appt: Visit date not found    Last OARRS:   RX Monitoring 2/18/2022   Attestation -   Periodic Controlled Substance Monitoring No signs of potential drug abuse or diversion identified.

## 2022-11-28 ENCOUNTER — OFFICE VISIT (OUTPATIENT)
Dept: FAMILY MEDICINE CLINIC | Age: 57
End: 2022-11-28
Payer: COMMERCIAL

## 2022-11-28 ENCOUNTER — TELEPHONE (OUTPATIENT)
Dept: FAMILY MEDICINE CLINIC | Age: 57
End: 2022-11-28

## 2022-11-28 VITALS
DIASTOLIC BLOOD PRESSURE: 68 MMHG | BODY MASS INDEX: 24.98 KG/M2 | HEART RATE: 109 BPM | HEIGHT: 63 IN | WEIGHT: 141 LBS | OXYGEN SATURATION: 99 % | SYSTOLIC BLOOD PRESSURE: 122 MMHG

## 2022-11-28 DIAGNOSIS — Z98.82 BREAST IMPLANT STATUS: ICD-10-CM

## 2022-11-28 DIAGNOSIS — F98.8 ATTENTION DEFICIT DISORDER, UNSPECIFIED HYPERACTIVITY PRESENCE: Primary | ICD-10-CM

## 2022-11-28 PROCEDURE — G8427 DOCREV CUR MEDS BY ELIG CLIN: HCPCS | Performed by: FAMILY MEDICINE

## 2022-11-28 PROCEDURE — 99213 OFFICE O/P EST LOW 20 MIN: CPT | Performed by: FAMILY MEDICINE

## 2022-11-28 PROCEDURE — G8484 FLU IMMUNIZE NO ADMIN: HCPCS | Performed by: FAMILY MEDICINE

## 2022-11-28 PROCEDURE — 3017F COLORECTAL CA SCREEN DOC REV: CPT | Performed by: FAMILY MEDICINE

## 2022-11-28 PROCEDURE — 4004F PT TOBACCO SCREEN RCVD TLK: CPT | Performed by: FAMILY MEDICINE

## 2022-11-28 PROCEDURE — G8420 CALC BMI NORM PARAMETERS: HCPCS | Performed by: FAMILY MEDICINE

## 2022-11-28 ASSESSMENT — ENCOUNTER SYMPTOMS
RESPIRATORY NEGATIVE: 1
GASTROINTESTINAL NEGATIVE: 1

## 2022-11-28 NOTE — TELEPHONE ENCOUNTER
Tell her to convert her virtual visit to an in person visit and I can see her here in the office and then write the note. She does not have to wait until 5:00 to come. She can drop by anytime before that.

## 2022-11-28 NOTE — TELEPHONE ENCOUNTER
Patient has a VV w/Dr. Ayse Pulliam at 5:00  Patient states she needs a note for work  Patient is going to wait in the parking lot for her VV visit and also for the note  She has to have a note to walk into work tomorrow  Contact patient if note will be ready prior to visit?   Patient is asking if the note can be ready prior to visit

## 2022-11-28 NOTE — PROGRESS NOTES
Amy Martinez (:  1965) is a 62 y.o. female,Established patient, here for evaluation of the following chief complaint(s):  Follow-up       Assessment/Plan:    CHLOE DE LA ROSA [facility] was seen today for follow-up. Diagnoses and all orders for this visit:    Attention deficit disorder, unspecified hyperactivity presence  OARRS report reviewed and no inconsistencies noted   The patient understands the risks of dependency/addiction with Vyvanse and will take as little as possible and discontinue as soon as possible   Breast implant status  -     Ambulatory referral to Plastic Surgery         Return in about 3 months (around 2023), or if symptoms worsen or fail to improve. Subjective   SUBJECTIVE/OBJECTIVE:  HPI  Breast implants 10-12 years ago and wonders if she needs replaced or removed but she is not having any significant issues. ADD follow up. Doing about the same as she always does. Review of Systems   Constitutional: Negative. Respiratory: Negative. Cardiovascular: Negative. Gastrointestinal: Negative. Endocrine: Negative. Genitourinary: Negative. Musculoskeletal: Negative. Neurological: Negative. Objective   Physical Exam  Constitutional:       General: She is not in acute distress. Appearance: She is not ill-appearing, toxic-appearing or diaphoretic. HENT:      Head: Normocephalic and atraumatic. Eyes:      Conjunctiva/sclera: Conjunctivae normal.   Skin:     General: Skin is warm and dry. Neurological:      Mental Status: She is alert and oriented to person, place, and time. Psychiatric:         Mood and Affect: Mood normal.         Behavior: Behavior normal.         Thought Content: Thought content normal.         Judgment: Judgment normal.                An electronic signature was used to authenticate this note.     --Josi Lawrence MD

## 2022-12-07 ENCOUNTER — TELEPHONE (OUTPATIENT)
Dept: FAMILY MEDICINE CLINIC | Age: 57
End: 2022-12-07

## 2022-12-07 DIAGNOSIS — Z12.31 ENCOUNTER FOR SCREENING MAMMOGRAM FOR MALIGNANT NEOPLASM OF BREAST: Primary | ICD-10-CM

## 2022-12-07 NOTE — TELEPHONE ENCOUNTER
[facility] called and they need a new referral placed for the pt's mammogram. Due to the time for her appointment for it, the order will be . . Don't need a call once order has been placed.

## 2022-12-08 NOTE — TELEPHONE ENCOUNTER
Order for mammogram is in epic. When future requests such as that come in please hunter up the orders and have them ready to sign.

## 2022-12-09 ENCOUNTER — HOSPITAL ENCOUNTER (OUTPATIENT)
Dept: WOMENS IMAGING | Age: 57
Discharge: HOME | End: 2022-12-09
Payer: COMMERCIAL

## 2022-12-09 VITALS — BODY MASS INDEX: 23.9 KG/M2 | HEIGHT: 64 IN | WEIGHT: 140 LBS

## 2022-12-09 DIAGNOSIS — Z12.31 ENCOUNTER FOR SCREENING MAMMOGRAM FOR MALIGNANT NEOPLASM OF BREAST: ICD-10-CM

## 2022-12-09 PROCEDURE — 77063 BREAST TOMOSYNTHESIS BI: CPT

## 2022-12-14 ENCOUNTER — TELEPHONE (OUTPATIENT)
Dept: FAMILY MEDICINE CLINIC | Age: 57
End: 2022-12-14

## 2022-12-14 DIAGNOSIS — N64.4 BREAST PAIN, RIGHT: Primary | ICD-10-CM

## 2022-12-15 ENCOUNTER — OFFICE VISIT (OUTPATIENT)
Dept: FAMILY MEDICINE CLINIC | Age: 57
End: 2022-12-15
Payer: COMMERCIAL

## 2022-12-15 VITALS
DIASTOLIC BLOOD PRESSURE: 68 MMHG | WEIGHT: 150 LBS | HEART RATE: 90 BPM | BODY MASS INDEX: 25.75 KG/M2 | OXYGEN SATURATION: 99 % | SYSTOLIC BLOOD PRESSURE: 116 MMHG

## 2022-12-15 DIAGNOSIS — N89.8 VAGINAL ODOR: Primary | ICD-10-CM

## 2022-12-15 LAB
BILIRUBIN, POC: NORMAL
BLOOD URINE, POC: NORMAL
CLARITY, POC: NORMAL
COLOR, POC: YELLOW
GLUCOSE URINE, POC: NORMAL
KETONES, POC: NORMAL
LEUKOCYTE EST, POC: NORMAL
NITRITE, POC: NORMAL
PH, POC: 5.5
PROTEIN, POC: NORMAL
SPECIFIC GRAVITY, POC: >=1.03
UROBILINOGEN, POC: 0.2

## 2022-12-15 PROCEDURE — 81002 URINALYSIS NONAUTO W/O SCOPE: CPT | Performed by: NURSE PRACTITIONER

## 2022-12-15 PROCEDURE — G8419 CALC BMI OUT NRM PARAM NOF/U: HCPCS | Performed by: NURSE PRACTITIONER

## 2022-12-15 PROCEDURE — G8484 FLU IMMUNIZE NO ADMIN: HCPCS | Performed by: NURSE PRACTITIONER

## 2022-12-15 PROCEDURE — 4004F PT TOBACCO SCREEN RCVD TLK: CPT | Performed by: NURSE PRACTITIONER

## 2022-12-15 PROCEDURE — 99213 OFFICE O/P EST LOW 20 MIN: CPT | Performed by: NURSE PRACTITIONER

## 2022-12-15 PROCEDURE — G8427 DOCREV CUR MEDS BY ELIG CLIN: HCPCS | Performed by: NURSE PRACTITIONER

## 2022-12-15 PROCEDURE — 3017F COLORECTAL CA SCREEN DOC REV: CPT | Performed by: NURSE PRACTITIONER

## 2022-12-15 RX ORDER — METRONIDAZOLE 7.5 MG/G
1 GEL VAGINAL NIGHTLY
Qty: 70 G | Refills: 0 | Status: SHIPPED | OUTPATIENT
Start: 2022-12-15 | End: 2022-12-20

## 2022-12-15 ASSESSMENT — PATIENT HEALTH QUESTIONNAIRE - PHQ9
4. FEELING TIRED OR HAVING LITTLE ENERGY: 0
3. TROUBLE FALLING OR STAYING ASLEEP: 0
SUM OF ALL RESPONSES TO PHQ QUESTIONS 1-9: 0
7. TROUBLE CONCENTRATING ON THINGS, SUCH AS READING THE NEWSPAPER OR WATCHING TELEVISION: 0
SUM OF ALL RESPONSES TO PHQ9 QUESTIONS 1 & 2: 0
1. LITTLE INTEREST OR PLEASURE IN DOING THINGS: 0
SUM OF ALL RESPONSES TO PHQ QUESTIONS 1-9: 0
10. IF YOU CHECKED OFF ANY PROBLEMS, HOW DIFFICULT HAVE THESE PROBLEMS MADE IT FOR YOU TO DO YOUR WORK, TAKE CARE OF THINGS AT HOME, OR GET ALONG WITH OTHER PEOPLE: 0
6. FEELING BAD ABOUT YOURSELF - OR THAT YOU ARE A FAILURE OR HAVE LET YOURSELF OR YOUR FAMILY DOWN: 0
5. POOR APPETITE OR OVEREATING: 0
8. MOVING OR SPEAKING SO SLOWLY THAT OTHER PEOPLE COULD HAVE NOTICED. OR THE OPPOSITE, BEING SO FIGETY OR RESTLESS THAT YOU HAVE BEEN MOVING AROUND A LOT MORE THAN USUAL: 0
2. FEELING DOWN, DEPRESSED OR HOPELESS: 0
9. THOUGHTS THAT YOU WOULD BE BETTER OFF DEAD, OR OF HURTING YOURSELF: 0

## 2022-12-15 ASSESSMENT — ENCOUNTER SYMPTOMS: ABDOMINAL PAIN: 0

## 2022-12-15 NOTE — PROGRESS NOTES
SUBJECTIVE:  Pt is a of 62 y.o. female comes in today with   Chief Complaint   Patient presents with    Vaginal Odor       Patient presenting today for evaluation of vaginal odor. Present for 4 days. Denies abnormal discharge. No vaginal bleeding. Slight itching. Denies pain, blood, burning with urination. No concern for STD. Long term BF. Using Dove body wash, new scent        Prior to Visit Medications    Medication Sig Taking? Authorizing Provider   lisdexamfetamine (VYVANSE) 30 MG capsule Take 1 capsule by mouth every morning for 30 days.  Yes Ruth Arellano MD   pantoprazole (PROTONIX) 40 MG tablet Take 1 tablet by mouth every morning (before breakfast) Yes Ruth Arellano MD   sertraline (ZOLOFT) 50 MG tablet Take 1 tablet by mouth Daily with supper Yes Peg Leonard MD   sertraline (ZOLOFT) 25 MG tablet Take 1 tablet by mouth daily Yes ANDREW Maguire CNP   fluticasone (FLONASE) 50 MCG/ACT nasal spray 2 sprays by Each Nostril route daily Yes Ruth Arellano MD   loratadine (CLARITIN) 10 MG tablet Take 1 tablet by mouth daily Yes Peg Leonard MD   cyclobenzaprine (FLEXERIL) 10 MG tablet Take 1 tablet by mouth 3 times daily as needed for Muscle spasms Yes Jin Jack MD   ibuprofen (ADVIL;MOTRIN) 800 MG tablet Take 1 tablet by mouth 3 times daily (with meals) Yes ANDREW Maguire CNP   ondansetron (ZOFRAN-ODT) 4 MG disintegrating tablet Take 1 tablet by mouth 3 times daily as needed for Nausea or Vomiting Yes ANDREW Maguire CNP   lidocaine (LMX) 4 % cream Apply topically as needed bid Yes Ruth Arellano MD   albuterol sulfate HFA (PROAIR HFA) 108 (90 Base) MCG/ACT inhaler Inhale 2 puffs into the lungs every 4 hours as needed for Wheezing Yes Ruth Arellano MD   Promethazine-Phenylephrine [facility]) 6.25-5 MG/5ML syrup Take 5 mLs by mouth every 6 hours Yes Joane Cogan, APRN - CNP   naproxen (NAPROSYN) 500 MG tablet Take 1 tablet by mouth 2 times daily (with meals) for 21 days  Jorgito Chung Sammy Hua MD         Patient's allergies, past medical, surgical, social and family histories werereviewed and updated as appropriate. Review of Systems   Constitutional:  Negative for fever. Gastrointestinal:  Negative for abdominal pain. Genitourinary:  Negative for dysuria, flank pain, frequency, hematuria and urgency. Vaginal odor         Physical Exam  Vitals reviewed. Constitutional:       Appearance: She is well-developed. HENT:      Head: Normocephalic and atraumatic. Skin:     General: Skin is warm and dry. Neurological:      Mental Status: She is alert and oriented to person, place, and time. Psychiatric:         Mood and Affect: Mood normal.         Behavior: Behavior normal.         Thought Content: Thought content normal.         Judgment: Judgment normal.     Vitals:    12/15/22 1432   BP: 116/68   Pulse: 90   SpO2: 99%   Weight: 150 lb (68 kg)       Results for POC orders placed in visit on 12/15/22   POCT Urinalysis no Micro   Result Value Ref Range    Color, UA yellow     Clarity, UA cloudy     Glucose, UA POC neg     Bilirubin, UA neg     Ketones, UA neg     Spec Grav, UA >=1.030     Blood, UA POC trace-intact     pH, UA 5.5     Protein, UA POC neg     Urobilinogen, UA 0.2     Leukocytes, UA small     Nitrite, UA neg            ASSESSMENT:  1. Vaginal odor        PLAN:  1. Vaginal odor  -     POCT Urinalysis no Micro  -     Culture, Urine  -     metroNIDAZOLE (METROGEL) 0.75 % vaginal gel; Place 1 Applicatorful vaginally at bedtime for 5 days    See pt instructions  F/u 5-7 days if no improvement, sooner if symptomsworsen. Discussed use, benefit, and side effects of prescribed medications. All patient questions answered. Pt voiced understanding.

## 2022-12-16 LAB — URINE CULTURE, ROUTINE: NORMAL

## 2022-12-19 DIAGNOSIS — F98.8 ATTENTION DEFICIT DISORDER, UNSPECIFIED HYPERACTIVITY PRESENCE: ICD-10-CM

## 2022-12-19 NOTE — TELEPHONE ENCOUNTER
----- Message from Juan bAreu sent at 12/19/2022 10:33 AM EST -----  Subject: Refill Request    QUESTIONS  Name of Medication? lisdexamfetamine (VYVANSE) 30 MG capsule  Patient-reported dosage and instructions? 1 daily   How many days do you have left? 3  Preferred Pharmacy? [facility] [id number]  Pharmacy phone number (if available)? [phone]  ---------------------------------------------------------------------------  --------------  Leta Chiang INFO  What is the best way for the office to contact you? OK to leave message on   voicemail  Preferred Call Back Phone Number? [phone]  ---------------------------------------------------------------------------  --------------  SCRIPT ANSWERS  Relationship to Patient?  Self

## 2022-12-19 NOTE — TELEPHONE ENCOUNTER
Medication:   Requested Prescriptions     Pending Prescriptions Disp Refills    lisdexamfetamine (VYVANSE) 30 MG capsule 30 capsule 0     Sig: Take 1 capsule by mouth every morning for 30 days. Last Filled:  11/22/2022    Patient Phone Number: [phone] (home)     Last appt: 12/15/2022   Next appt: Visit date not found    Last OARRS:   RX Monitoring 11/28/2022   Attestation -   Periodic Controlled Substance Monitoring No signs of potential drug abuse or diversion identified.

## 2022-12-20 ENCOUNTER — TELEPHONE (OUTPATIENT)
Dept: FAMILY MEDICINE CLINIC | Age: 57
End: 2022-12-20

## 2022-12-20 NOTE — TELEPHONE ENCOUNTER
Patient wants to make sure this letter has shown she has been sick since September on. Is this possible?  [phone]

## 2022-12-20 NOTE — TELEPHONE ENCOUNTER
That would be up to any of the other providers who saw her. I saw her in June and then not again until the end of November therefore I cannot vouch for her health in September and October.

## 2022-12-20 NOTE — TELEPHONE ENCOUNTER
----- Message from Bing Pak sent at 12/19/2022  5:00 PM EST -----  Subject: Message to Provider    QUESTIONS  Information for Provider? Pt is requesting copies of any and all medical   excuses that were written for her place of employment over the last six   months. Pt needs information on or before 12/23/2022 for unemployment   purposes. Please call pt to advise.   ---------------------------------------------------------------------------  --------------  Melissa Jones INFO  [phone]; OK to leave message on voicemail  ---------------------------------------------------------------------------  --------------  SCRIPT ANSWERS  Relationship to Patient?  Self

## 2022-12-20 NOTE — TELEPHONE ENCOUNTER
----- Message from [facility] sent at 12/20/2022  4:23 PM EST -----  Subject: Message to Provider    QUESTIONS  Information for Provider? Patient called in to let her provider know that   she is able to come  the paperwork that she requested tomorrow. Please contact patient to further assist   ---------------------------------------------------------------------------  --------------  [employer]  [phone]; OK to leave message on voicemail  ---------------------------------------------------------------------------  --------------  SCRIPT ANSWERS  Relationship to Patient?  Self

## 2022-12-21 ENCOUNTER — OFFICE VISIT (OUTPATIENT)
Dept: FAMILY MEDICINE CLINIC | Age: 57
End: 2022-12-21
Payer: COMMERCIAL

## 2022-12-21 VITALS
DIASTOLIC BLOOD PRESSURE: 70 MMHG | OXYGEN SATURATION: 98 % | HEIGHT: 64 IN | WEIGHT: 151.4 LBS | BODY MASS INDEX: 25.85 KG/M2 | SYSTOLIC BLOOD PRESSURE: 134 MMHG | HEART RATE: 74 BPM | RESPIRATION RATE: 16 BRPM

## 2022-12-21 DIAGNOSIS — F43.23 ADJUSTMENT DISORDER WITH MIXED ANXIETY AND DEPRESSED MOOD: Primary | ICD-10-CM

## 2022-12-21 PROCEDURE — G8427 DOCREV CUR MEDS BY ELIG CLIN: HCPCS | Performed by: FAMILY MEDICINE

## 2022-12-21 PROCEDURE — 99213 OFFICE O/P EST LOW 20 MIN: CPT | Performed by: FAMILY MEDICINE

## 2022-12-21 PROCEDURE — 4004F PT TOBACCO SCREEN RCVD TLK: CPT | Performed by: FAMILY MEDICINE

## 2022-12-21 PROCEDURE — G8484 FLU IMMUNIZE NO ADMIN: HCPCS | Performed by: FAMILY MEDICINE

## 2022-12-21 PROCEDURE — 3017F COLORECTAL CA SCREEN DOC REV: CPT | Performed by: FAMILY MEDICINE

## 2022-12-21 PROCEDURE — G8419 CALC BMI OUT NRM PARAM NOF/U: HCPCS | Performed by: FAMILY MEDICINE

## 2022-12-21 ASSESSMENT — PATIENT HEALTH QUESTIONNAIRE - PHQ9
9. THOUGHTS THAT YOU WOULD BE BETTER OFF DEAD, OR OF HURTING YOURSELF: 0
5. POOR APPETITE OR OVEREATING: 0
10. IF YOU CHECKED OFF ANY PROBLEMS, HOW DIFFICULT HAVE THESE PROBLEMS MADE IT FOR YOU TO DO YOUR WORK, TAKE CARE OF THINGS AT HOME, OR GET ALONG WITH OTHER PEOPLE: 0
SUM OF ALL RESPONSES TO PHQ QUESTIONS 1-9: 0
SUM OF ALL RESPONSES TO PHQ9 QUESTIONS 1 & 2: 0
SUM OF ALL RESPONSES TO PHQ QUESTIONS 1-9: 0
SUM OF ALL RESPONSES TO PHQ QUESTIONS 1-9: 0
8. MOVING OR SPEAKING SO SLOWLY THAT OTHER PEOPLE COULD HAVE NOTICED. OR THE OPPOSITE, BEING SO FIGETY OR RESTLESS THAT YOU HAVE BEEN MOVING AROUND A LOT MORE THAN USUAL: 0
1. LITTLE INTEREST OR PLEASURE IN DOING THINGS: 0
SUM OF ALL RESPONSES TO PHQ QUESTIONS 1-9: 0
6. FEELING BAD ABOUT YOURSELF - OR THAT YOU ARE A FAILURE OR HAVE LET YOURSELF OR YOUR FAMILY DOWN: 0
3. TROUBLE FALLING OR STAYING ASLEEP: 0
7. TROUBLE CONCENTRATING ON THINGS, SUCH AS READING THE NEWSPAPER OR WATCHING TELEVISION: 0
4. FEELING TIRED OR HAVING LITTLE ENERGY: 0
2. FEELING DOWN, DEPRESSED OR HOPELESS: 0

## 2022-12-21 NOTE — Clinical Note
[address]  Phone: [phone]  Fax: [phone]    Krista Villatoro MD        December 21, 2022     Patient: Kendell Rodriguez   YOB: 1965   Date of Visit: 12/21/2022       To Whom It May Concern: It is my medical opinion that Kendell Rodriguez {Work release (duty restriction):44149}. If you have any questions or concerns, please don't hesitate to call.     Sincerely,        Krista Villatoro MD

## 2022-12-21 NOTE — PROGRESS NOTES
Rosenda Barrera is a 62 y.o. female. HPI:  Changing jobs  Wanting confirmation of time off letter is written through our office  I went through and printed off what I could  Helped her with her form    recommend she follow-up with PCP for health maintenance items the future    50 mg Zoloft helping her anxiety and depression, continue    Meds, vitamins and allergies reviewed with pt    Wt Readings from Last 3 Encounters:   12/21/22 151 lb 6.4 oz (68.7 kg)   12/15/22 150 lb (68 kg)   12/09/22 140 lb (63.5 kg)       REVIEW OF SYSTEMS:   CONSTITUTIONAL: See history of present illness,   Weight noted   HEENT: No new vision difficulties or ringing in the ears. RESPIRATORY: No new SOB, PND, orthopnea or cough. CARDIOVASCULAR: no CP, palpitations or SOB with exertion  GI: No nausea, vomiting, diarrhea, constipation, abdominal pain or changes in bowel habits. : No urinary frequency, urgency, incontinence hematuria or dysuria. SKIN: No cyanosis or skin lesions. MUSCULOSKELETAL: No new muscle or joint pain. NEUROLOGICAL: No syncope or TIA-like symptoms. PSYCHIATRIC: Proved on 50 mg Zoloft, continue    Allergies   Allergen Reactions    Pcn [Penicillins] Other (See Comments)       Prior to Visit Medications    Medication Sig Taking? Authorizing Provider   lisdexamfetamine (VYVANSE) 30 MG capsule Take 1 capsule by mouth every morning for 30 days.  Yes Faina Hobbs MD   pantoprazole (PROTONIX) 40 MG tablet Take 1 tablet by mouth every morning (before breakfast) Yes Faina Hobbs MD   sertraline (ZOLOFT) 50 MG tablet Take 1 tablet by mouth Daily with supper Yes Inge Cash MD   sertraline (ZOLOFT) 25 MG tablet Take 1 tablet by mouth daily Yes Kelsy Bowman APRN - CNP   fluticasone (FLONASE) 50 MCG/ACT nasal spray 2 sprays by Each Nostril route daily Yes Faina Hobbs MD   loratadine (CLARITIN) 10 MG tablet Take 1 tablet by mouth daily Yes Inge Cash MD   cyclobenzaprine (FLEXERIL) 10 MG tablet Take 1 tablet by mouth 3 times daily as needed for Muscle spasms Yes Beth Bedolla MD   ibuprofen (ADVIL;MOTRIN) 800 MG tablet Take 1 tablet by mouth 3 times daily (with meals) Yes ANDREW Rose CNP   ondansetron (ZOFRAN-ODT) 4 MG disintegrating tablet Take 1 tablet by mouth 3 times daily as needed for Nausea or Vomiting Yes ANDREW Rose CNP   lidocaine (LMX) 4 % cream Apply topically as needed bid Yes Johanna Anne MD   albuterol sulfate HFA (PROAIR HFA) 108 (90 Base) MCG/ACT inhaler Inhale 2 puffs into the lungs every 4 hours as needed for Wheezing Yes Johanna Anne MD   Promethazine-Phenylephrine [facility]) 6.25-5 MG/5ML syrup Take 5 mLs by mouth every 6 hours Yes Christel Opitz, APRN - CNP   naproxen (NAPROSYN) 500 MG tablet Take 1 tablet by mouth 2 times daily (with meals) for 21 days  Beth Bedolla MD       Past Medical History:   Diagnosis Date    ADD (attention deficit disorder) 3/4/2016       Social History     Tobacco Use    Smoking status: Every Day     Packs/day: 0.25     Years: 10.00     Pack years: 2.50     Types: Cigarettes    Smokeless tobacco: Never   Substance Use Topics    Alcohol use: Yes     Alcohol/week: 0.0 standard drinks     Comment: occ       Family History   Problem Relation Age of Onset    Diabetes Father        OBJECTIVE:  /70 (Site: Left Upper Arm, Position: Sitting, Cuff Size: Small Adult)   Pulse 74   Resp 16   Ht 5' 4\" (1.626 m)   Wt 151 lb 6.4 oz (68.7 kg)   LMP 01/24/2019   SpO2 98%   BMI 25.99 kg/m²   GEN:  in NAD  EXT: No rash or edema  NEURO: Alert oriented ×3, nonfocal no assistive device    ASSESSMENT/PLAN:  1.  Adjustment disorder with mixed anxiety and depressed mood  New 50 mg Zoloft  Printed off confirmation of the times we allowed her to be off due to anxiety depression and any illness  Also printed off her FMLA form from Dr. Edilson Galindo for routine care with PCP in the future    25 Total Minutes spent pre charting (reviewing problem list, meds, any test results, consultant and hospital notes ) and  obtaining present visit history, performing appropriate medical exam/evaluation, counseling and educating the patient (and family), ordering medications ,tests, and procedures as needed, refilling medication(s), placing referral(s) when needed in addition to coordinating care for this patient and documenting in electronic health record

## 2023-01-18 DIAGNOSIS — F98.8 ATTENTION DEFICIT DISORDER, UNSPECIFIED HYPERACTIVITY PRESENCE: ICD-10-CM

## 2023-01-18 NOTE — TELEPHONE ENCOUNTER
Medication:   Requested Prescriptions     Pending Prescriptions Disp Refills    lisdexamfetamine (VYVANSE) 30 MG capsule 30 capsule 0     Sig: Take 1 capsule by mouth every morning for 30 days. Last Filled:  12/19/2022    Patient Phone Number: [phone] (home)     Last appt: 12/21/2022   Next appt: Visit date not found    Last OARRS:   RX Monitoring 11/28/2022   Attestation -   Periodic Controlled Substance Monitoring No signs of potential drug abuse or diversion identified.

## 2023-01-18 NOTE — TELEPHONE ENCOUNTER
----- Message from [facility] sent at 1/18/2023 10:52 AM EST -----  Subject: Refill Request    QUESTIONS  Name of Medication? lisdexamfetamine (VYVANSE) 30 MG capsule  Patient-reported dosage and instructions? once a day 30 MG   How many days do you have left? 4  Preferred Pharmacy? [facility] [id number]  Pharmacy phone number (if available)? [phone]  ---------------------------------------------------------------------------  --------------  [place] INFO  What is the best way for the office to contact you? OK to leave message on   voicemail  Preferred Call Back Phone Number? [phone]  ---------------------------------------------------------------------------  --------------  SCRIPT ANSWERS  Relationship to Patient?  Self

## 2023-02-20 DIAGNOSIS — F98.8 ATTENTION DEFICIT DISORDER, UNSPECIFIED HYPERACTIVITY PRESENCE: ICD-10-CM

## 2023-02-20 NOTE — TELEPHONE ENCOUNTER
Medication and Quantity requested: Vyvanse 30mg     Last Visit  12-21-22,Dr. Annabel ARCE 450 and phone number updated in EPIC:  yes,Germaine

## 2023-03-20 DIAGNOSIS — F98.8 ATTENTION DEFICIT DISORDER, UNSPECIFIED HYPERACTIVITY PRESENCE: ICD-10-CM

## 2023-03-20 NOTE — TELEPHONE ENCOUNTER
----- Message from Tremayne Barton MA sent at 3/20/2023  9:42 AM EDT -----  Subject: Refill Request    QUESTIONS  Name of Medication? lisdexamfetamine (VYVANSE) 30 MG capsule  Patient-reported dosage and instructions? 1 capsule by mouth daily  How many days do you have left? 2  Preferred Pharmacy? [facility] [id number]  Pharmacy phone number (if available)? [phone]  ---------------------------------------------------------------------------  --------------  [employer] INFO  What is the best way for the office to contact you? OK to leave message on   voicemail  Preferred Call Back Phone Number? [phone]  ---------------------------------------------------------------------------  --------------  SCRIPT ANSWERS  Relationship to Patient?  Self

## 2023-03-20 NOTE — TELEPHONE ENCOUNTER
Medication:   Requested Prescriptions     Pending Prescriptions Disp Refills    lisdexamfetamine (VYVANSE) 30 MG capsule 30 capsule 0     Sig: Take 1 capsule by mouth every morning for 30 days. Last Filled:  2/20/23    Patient Phone Number: [phone] (home)     Last appt: 12/21/2022   Next appt: Visit date not found    Last OARRS:   RX Monitoring 11/28/2022   Attestation -   Periodic Controlled Substance Monitoring No signs of potential drug abuse or diversion identified. Medication:   Requested Prescriptions     Pending Prescriptions Disp Refills    lisdexamfetamine (VYVANSE) 30 MG capsule 30 capsule 0     Sig: Take 1 capsule by mouth every morning for 30 days. Last Filled:      Patient Phone Number: [phone] (home)     Last appt: 12/21/2022   Next appt: Visit date not found    Last OARRS:   RX Monitoring 11/28/2022   Attestation -   Periodic Controlled Substance Monitoring No signs of potential drug abuse or diversion identified.

## 2023-04-17 DIAGNOSIS — F98.8 ATTENTION DEFICIT DISORDER, UNSPECIFIED HYPERACTIVITY PRESENCE: ICD-10-CM

## 2023-04-17 DIAGNOSIS — N89.8 VAGINAL ODOR: ICD-10-CM

## 2023-04-17 RX ORDER — METRONIDAZOLE 7.5 MG/G
GEL VAGINAL
Qty: 70 G | Refills: 0 | Status: SHIPPED | OUTPATIENT
Start: 2023-04-17

## 2023-04-17 NOTE — TELEPHONE ENCOUNTER
----- Message from Gema Henderson sent at 4/17/2023  9:32 AM EDT -----  Subject: Refill Request    QUESTIONS  Name of Medication? lisdexamfetamine (VYVANSE) 30 MG capsule  Patient-reported dosage and instructions? 1 time per day   How many days do you have left? 4  Preferred Pharmacy? [facility] [id number]  Pharmacy phone number (if available)? [phone]  ---------------------------------------------------------------------------  --------------  Saul MURRY  What is the best way for the office to contact you? OK to leave message on   voicemail  Preferred Call Back Phone Number? [phone]  ---------------------------------------------------------------------------  --------------  SCRIPT ANSWERS  Relationship to Patient?  Self

## 2023-04-17 NOTE — TELEPHONE ENCOUNTER
Medication:   Requested Prescriptions     Pending Prescriptions Disp Refills    lisdexamfetamine (VYVANSE) 30 MG capsule 30 capsule 0     Sig: Take 1 capsule by mouth every morning for 30 days. Last Filled:  3/20/2023    Patient Phone Number: [phone] (home)     Last appt: 12/21/2022   Next appt: Visit date not found    Last OARRS:   RX Monitoring 3/20/2023   Attestation -   Periodic Controlled Substance Monitoring No signs of potential drug abuse or diversion identified.

## 2023-04-17 NOTE — TELEPHONE ENCOUNTER
Medication:   Requested Prescriptions     Pending Prescriptions Disp Refills    metroNIDAZOLE (METROGEL) 0.75 % vaginal gel [Pharmacy Med Name: METRONIDAZOLE VAGINAL 0.75% GEL] 70 g 0     Sig: INSERT ONE APPLICATORFUL VAGINALLY EVERY NIGHT AT BEDTIME FOR 5 DAYS        Last Filled:  12/15/2022, 70g, 0    Patient Phone Number: [phone] (home)     Last appt: 12/21/2022   Next appt: Visit date not found    Last OARRS:   RX Monitoring 3/20/2023   Attestation -   Periodic Controlled Substance Monitoring No signs of potential drug abuse or diversion identified.

## 2023-05-15 ENCOUNTER — OFFICE VISIT (OUTPATIENT)
Dept: FAMILY MEDICINE CLINIC | Age: 58
End: 2023-05-15

## 2023-05-15 VITALS
SYSTOLIC BLOOD PRESSURE: 162 MMHG | OXYGEN SATURATION: 98 % | DIASTOLIC BLOOD PRESSURE: 87 MMHG | WEIGHT: 147 LBS | BODY MASS INDEX: 25.1 KG/M2 | HEIGHT: 64 IN | HEART RATE: 62 BPM

## 2023-05-15 DIAGNOSIS — F32.A ANXIETY AND DEPRESSION: Primary | ICD-10-CM

## 2023-05-15 DIAGNOSIS — F41.9 ANXIETY AND DEPRESSION: Primary | ICD-10-CM

## 2023-05-15 DIAGNOSIS — F98.8 ATTENTION DEFICIT DISORDER, UNSPECIFIED HYPERACTIVITY PRESENCE: ICD-10-CM

## 2023-05-15 PROCEDURE — 4004F PT TOBACCO SCREEN RCVD TLK: CPT | Performed by: FAMILY MEDICINE

## 2023-05-15 PROCEDURE — G8419 CALC BMI OUT NRM PARAM NOF/U: HCPCS | Performed by: FAMILY MEDICINE

## 2023-05-15 PROCEDURE — 3017F COLORECTAL CA SCREEN DOC REV: CPT | Performed by: FAMILY MEDICINE

## 2023-05-15 PROCEDURE — G8427 DOCREV CUR MEDS BY ELIG CLIN: HCPCS | Performed by: FAMILY MEDICINE

## 2023-05-15 PROCEDURE — 99213 OFFICE O/P EST LOW 20 MIN: CPT | Performed by: FAMILY MEDICINE

## 2023-05-15 RX ORDER — CLONAZEPAM 1 MG/1
1 TABLET ORAL NIGHTLY PRN
Qty: 30 TABLET | Refills: 0 | Status: SHIPPED | OUTPATIENT
Start: 2023-05-15 | End: 2023-06-14

## 2023-05-15 SDOH — ECONOMIC STABILITY: FOOD INSECURITY: WITHIN THE PAST 12 MONTHS, THE FOOD YOU BOUGHT JUST DIDN'T LAST AND YOU DIDN'T HAVE MONEY TO GET MORE.: PATIENT DECLINED

## 2023-05-15 SDOH — ECONOMIC STABILITY: INCOME INSECURITY: HOW HARD IS IT FOR YOU TO PAY FOR THE VERY BASICS LIKE FOOD, HOUSING, MEDICAL CARE, AND HEATING?: PATIENT DECLINED

## 2023-05-15 SDOH — ECONOMIC STABILITY: FOOD INSECURITY: WITHIN THE PAST 12 MONTHS, YOU WORRIED THAT YOUR FOOD WOULD RUN OUT BEFORE YOU GOT MONEY TO BUY MORE.: PATIENT DECLINED

## 2023-05-15 SDOH — ECONOMIC STABILITY: HOUSING INSECURITY
IN THE LAST 12 MONTHS, WAS THERE A TIME WHEN YOU DID NOT HAVE A STEADY PLACE TO SLEEP OR SLEPT IN A SHELTER (INCLUDING NOW)?: PATIENT REFUSED

## 2023-05-15 ASSESSMENT — PATIENT HEALTH QUESTIONNAIRE - PHQ9
10. IF YOU CHECKED OFF ANY PROBLEMS, HOW DIFFICULT HAVE THESE PROBLEMS MADE IT FOR YOU TO DO YOUR WORK, TAKE CARE OF THINGS AT HOME, OR GET ALONG WITH OTHER PEOPLE: 0
1. LITTLE INTEREST OR PLEASURE IN DOING THINGS: 0
4. FEELING TIRED OR HAVING LITTLE ENERGY: 0
SUM OF ALL RESPONSES TO PHQ QUESTIONS 1-9: 0
SUM OF ALL RESPONSES TO PHQ9 QUESTIONS 1 & 2: 0
SUM OF ALL RESPONSES TO PHQ QUESTIONS 1-9: 0
2. FEELING DOWN, DEPRESSED OR HOPELESS: 0
SUM OF ALL RESPONSES TO PHQ QUESTIONS 1-9: 0
7. TROUBLE CONCENTRATING ON THINGS, SUCH AS READING THE NEWSPAPER OR WATCHING TELEVISION: 0
3. TROUBLE FALLING OR STAYING ASLEEP: 0
SUM OF ALL RESPONSES TO PHQ QUESTIONS 1-9: 0
6. FEELING BAD ABOUT YOURSELF - OR THAT YOU ARE A FAILURE OR HAVE LET YOURSELF OR YOUR FAMILY DOWN: 0
5. POOR APPETITE OR OVEREATING: 0
8. MOVING OR SPEAKING SO SLOWLY THAT OTHER PEOPLE COULD HAVE NOTICED. OR THE OPPOSITE, BEING SO FIGETY OR RESTLESS THAT YOU HAVE BEEN MOVING AROUND A LOT MORE THAN USUAL: 0
9. THOUGHTS THAT YOU WOULD BE BETTER OFF DEAD, OR OF HURTING YOURSELF: 0

## 2023-05-15 ASSESSMENT — ENCOUNTER SYMPTOMS
RESPIRATORY NEGATIVE: 1
GASTROINTESTINAL NEGATIVE: 1

## 2023-05-15 NOTE — PROGRESS NOTES
Lorenzo Jordan (:  1965) is a 62 y.o. female,Established patient, here for evaluation of the following chief complaint(s):  Depression         ASSESSMENT/PLAN:  1. Anxiety and depression  -     clonazePAM (KLONOPIN) 1 MG tablet; Take 1 tablet by mouth nightly as needed (insomnia) for up to 30 days. Max Daily Amount: 1 mg, Disp-30 tablet, R-0Normal  Advised regarding potential for dependency  2. Attention deficit disorder, unspecified hyperactivity presence  -     lisdexamfetamine (VYVANSE) 30 MG capsule; Take 1 capsule by mouth every morning for 30 days. , Disp-30 capsule, R-0Normal  OARRS report reviewed and no inconsistencies noted   The patient understands the risks of dependency/addiction with Vyvanse and will take as little as possible and discontinue as soon as possible       Return in about 1 month (around 6/15/2023). Subjective   SUBJECTIVE/OBJECTIVE:  HPI  The patient states she is under stress due to issues going on with her son involving the law in which he was pulled over and ran. She is feeling depressed and is unable to sleep. She has had clonazepam in the past which she would like to try again because it . Review of Systems   Constitutional: Negative. Respiratory: Negative. Cardiovascular: Negative. Gastrointestinal: Negative. Endocrine: Negative. Genitourinary: Negative. Musculoskeletal:  Positive for arthralgias. Neurological: Negative. Psychiatric/Behavioral:  Positive for dysphoric mood and sleep disturbance. The patient is nervous/anxious. Objective   Physical Exam  Constitutional:       General: She is not in acute distress. Appearance: She is not ill-appearing, toxic-appearing or diaphoretic. HENT:      Head: Normocephalic and atraumatic. Eyes:      Conjunctiva/sclera: Conjunctivae normal.   Neck:      Thyroid: No thyromegaly. Vascular: No carotid bruit. Cardiovascular:      Rate and Rhythm: Normal rate and regular rhythm.

## 2023-05-18 ENCOUNTER — TELEPHONE (OUTPATIENT)
Dept: FAMILY MEDICINE CLINIC | Age: 58
End: 2023-05-18

## 2023-05-30 ENCOUNTER — TELEPHONE (OUTPATIENT)
Dept: FAMILY MEDICINE CLINIC | Age: 58
End: 2023-05-30

## 2023-05-30 NOTE — TELEPHONE ENCOUNTER
----- Message from Mane Marion, [address] sent at 5/30/2023  1:39 PM EDT -----  Subject: Message to Provider    QUESTIONS  Information for Provider? Patient would like to have a note filled out   stating that she had COVID for her rent/utility assistance. Letter will   need to state when the patient had COVID. Patient states that she spoke to   someone over the phone and they said the more information the better. For   questions about the COVID relief letter please call Lady Kendall at   [phone].  ---------------------------------------------------------------------------  --------------  Pj Ponce QTAMY  [phone]; OK to leave message on voicemail  ---------------------------------------------------------------------------  --------------  SCRIPT ANSWERS  Relationship to Patient?  Self

## 2023-06-07 DIAGNOSIS — F32.A ANXIETY AND DEPRESSION: ICD-10-CM

## 2023-06-07 DIAGNOSIS — F98.8 ATTENTION DEFICIT DISORDER, UNSPECIFIED HYPERACTIVITY PRESENCE: ICD-10-CM

## 2023-06-07 DIAGNOSIS — F41.9 ANXIETY AND DEPRESSION: ICD-10-CM

## 2023-06-07 RX ORDER — CLONAZEPAM 1 MG/1
1 TABLET ORAL NIGHTLY PRN
Qty: 30 TABLET | Refills: 0 | OUTPATIENT
Start: 2023-06-07 | End: 2023-07-07

## 2023-06-07 NOTE — TELEPHONE ENCOUNTER
----- Message from Lotus Ambar sent at 6/7/2023  2:06 PM EDT -----  Subject: Refill Request    QUESTIONS  Name of Medication? clonazePAM (KLONOPIN) 1 MG tablet  Patient-reported dosage and instructions? 1 mg once nightly   How many days do you have left? 10  [facility] [id number]  Pharmacy phone number (if available)? [phone]  ---------------------------------------------------------------------------  --------------,  Name of Medication? lisdexamfetamine (VYVANSE) 30 MG capsule  Patient-reported dosage and instructions? 30 mg, once daily  How many days do you have left? 15  Preferred Pharmacy? [email or web address]  [id number]  Pharmacy phone number (if available)? [phone]  ---------------------------------------------------------------------------  --------------,  Name of Medication? Other - arthritis cream for hand  Patient-reported dosage and instructions? as directed  How many days do you have left? 0  Preferred Pharmacy? [facility]  [id number]  Pharmacy phone number (if available)? [phone]  Additional Information for Provider? This hasn't been prescribed yet. She   stated she asked the PCP for it at the last visit  ---------------------------------------------------------------------------  --------------  [address]  What is the best way for the office to contact you? OK to leave message on   voicemail  Preferred Call Back Phone Number? [phone]  ---------------------------------------------------------------------------  --------------  SCRIPT ANSWERS  Relationship to Patient?  Self

## 2023-06-07 NOTE — TELEPHONE ENCOUNTER
----- Message from Juanleandro Gonzales sent at 6/7/2023  2:08 PM EDT -----  Subject: Message to Provider    QUESTIONS  Information for Provider? Pt would like to be advised who she can go to to   have moles removed. Is she able to be send in the office or does she need   a referral. Please call back. ---------------------------------------------------------------------------  --------------  Melissa Jones [facility]  [phone]; OK to leave message on voicemail  ---------------------------------------------------------------------------  --------------  SCRIPT ANSWERS  Relationship to Patient?  Self

## 2023-06-13 DIAGNOSIS — F32.A ANXIETY AND DEPRESSION: ICD-10-CM

## 2023-06-13 DIAGNOSIS — F41.9 ANXIETY AND DEPRESSION: ICD-10-CM

## 2023-06-13 RX ORDER — CLONAZEPAM 1 MG/1
TABLET ORAL
Qty: 30 TABLET | OUTPATIENT
Start: 2023-06-13

## 2023-06-13 NOTE — TELEPHONE ENCOUNTER
Medication:   Requested Prescriptions     Pending Prescriptions Disp Refills    clonazePAM (KLONOPIN) 1 MG tablet [Pharmacy Med Name: clonazePAM 1 MG TABLET] 30 tablet      Sig: TAKE 1 TABLET BY MOUTH NIGHTLY AS NEEDED FOR INSOMNIA (MAX DOSE 1 TABLET PER DAY)        Last Filled: 5/15/2023     Patient Phone Number: [phone] (home)     Last appt: 5/15/2023   Next appt: 6/13/2023    Last OARRS:   RX Monitoring 6/7/2023   Attestation -   Periodic Controlled Substance Monitoring No signs of potential drug abuse or diversion identified.

## 2023-06-19 DIAGNOSIS — F98.8 ATTENTION DEFICIT DISORDER, UNSPECIFIED HYPERACTIVITY PRESENCE: ICD-10-CM

## 2023-06-19 NOTE — TELEPHONE ENCOUNTER
Lov 5/15/23  Lrf  30 0 5/15/23 Medication:   Requested Prescriptions     Pending Prescriptions Disp Refills    lisdexamfetamine (VYVANSE) 30 MG capsule 30 capsule 0     Sig: Take 1 capsule by mouth every morning for 30 days. Last Filled:      Patient Phone Number: [phone] (home)     Last appt: 5/15/2023   Next appt: Visit date not found    Last OARRS:   RX Monitoring 6/7/2023   Attestation -   Periodic Controlled Substance Monitoring No signs of potential drug abuse or diversion identified.

## 2023-06-19 NOTE — TELEPHONE ENCOUNTER
Medication and Quantity requested: Vyvanse 30mg     Last Visit  5-15-23, [address] and phone number updated in EPIC:  yes,Germaine

## 2023-07-10 DIAGNOSIS — F41.9 ANXIETY AND DEPRESSION: ICD-10-CM

## 2023-07-10 DIAGNOSIS — F32.A ANXIETY AND DEPRESSION: ICD-10-CM

## 2023-07-11 RX ORDER — CLONAZEPAM 1 MG/1
TABLET ORAL
Qty: 30 TABLET | Refills: 0 | Status: SHIPPED | OUTPATIENT
Start: 2023-07-11 | End: 2023-08-10

## 2023-07-11 NOTE — TELEPHONE ENCOUNTER
Medication:   Requested Prescriptions     Pending Prescriptions Disp Refills    clonazePAM (KLONOPIN) 1 MG tablet [Pharmacy Med Name: CLONAZEPAM 1 MG TABLET] 30 tablet 0     Sig: TAKE ONE TABLET BY MOUTH ONCE NIGHTLY AS NEEDED (INSOMNIA)  MAX OF 1 TABLET PER DAY            Last appt: 5/15/2023   Next appt: Visit date not found    Last OARRS:   RX Monitoring 6/7/2023   Attestation -   Periodic Controlled Substance Monitoring No signs of potential drug abuse or diversion identified.

## 2023-07-18 DIAGNOSIS — F98.8 ATTENTION DEFICIT DISORDER, UNSPECIFIED HYPERACTIVITY PRESENCE: ICD-10-CM

## 2023-07-18 NOTE — TELEPHONE ENCOUNTER
Medication:   Requested Prescriptions     Pending Prescriptions Disp Refills    lisdexamfetamine (VYVANSE) 30 MG capsule 30 capsule 0     Sig: Take 1 capsule by mouth every morning for 30 days. Last Filled:  6/19/2023    Patient Phone Number: [phone] (home)     Last appt: 5/15/2023   Next appt: Visit date not found    Last OARRS:   RX Monitoring 6/7/2023   Attestation -   Periodic Controlled Substance Monitoring No signs of potential drug abuse or diversion identified.

## 2023-07-18 NOTE — TELEPHONE ENCOUNTER
----- Message from Noe Query sent at 7/18/2023  1:29 PM EDT -----  Subject: Refill Request    QUESTIONS  Name of Medication? lisdexamfetamine (VYVANSE) 30 MG capsule  Patient-reported dosage and instructions? 1 TAB A DAY  How many days do you have left? 3  Preferred Pharmacy? [address] [id number]  Pharmacy phone number (if available)? [phone]  Additional Information for Provider? PT NEEDS A REFILL   ---------------------------------------------------------------------------  --------------  CALL BACK INFO  What is the best way for the office to contact you? OK to leave message on   voicemail  Preferred Call Back Phone Number? [phone]  ---------------------------------------------------------------------------  --------------  SCRIPT ANSWERS  Relationship to Patient?  Self

## 2023-08-01 NOTE — TELEPHONE ENCOUNTER
Is This A New Presentation, Or A Follow-Up?: Skin Lesions Unable to find arthritis cream to hunter up please review    Medication:   Requested Prescriptions     Pending Prescriptions Disp Refills    clonazePAM (KLONOPIN) 1 MG tablet 30 tablet 0     Sig: Take 1 tablet by mouth nightly as needed (insomnia) for up to 30 days. Max Daily Amount: 1 mg    lisdexamfetamine (VYVANSE) 30 MG capsule 30 capsule 0     Sig: Take 1 capsule by mouth every morning for 30 days. Last Filled:  5/15/2023, 30, 0  5/15/2023, 30, 0    Patient Phone Number: [phone] (home)     Last appt: 5/15/2023   Next appt: Visit date not found    Last OARRS:   RX Monitoring 5/15/2023   Attestation -   Periodic Controlled Substance Monitoring No signs of potential drug abuse or diversion identified. How Severe Is Your Skin Lesion?: moderate

## 2023-08-01 NOTE — TELEPHONE ENCOUNTER
At this point, patient will need an appointment for further notes. Detail Level: Zone This was chief complaint\\nPresent many years, no change. Reassured

## 2023-08-08 DIAGNOSIS — F32.A ANXIETY AND DEPRESSION: ICD-10-CM

## 2023-08-08 DIAGNOSIS — F41.9 ANXIETY AND DEPRESSION: ICD-10-CM

## 2023-08-08 RX ORDER — CLONAZEPAM 1 MG/1
TABLET ORAL
Qty: 30 TABLET | Refills: 0 | Status: SHIPPED | OUTPATIENT
Start: 2023-08-08 | End: 2023-09-07

## 2023-08-08 NOTE — TELEPHONE ENCOUNTER
----- Message from Clifford Joseph sent at 8/8/2023  8:34 AM EDT -----  Subject: Refill Request    QUESTIONS  Name of Medication? clonazePAM (KLONOPIN) 1 MG tablet  Patient-reported dosage and instructions? 1 mg daily   How many days do you have left? 2  Preferred Pharmacy? [facility] [id number]  Pharmacy phone number (if available)? [phone]  ---------------------------------------------------------------------------  --------------  Nicolle MURRY  What is the best way for the office to contact you? OK to leave message on   voicemail  Preferred Call Back Phone Number? [phone]  ---------------------------------------------------------------------------  --------------  SCRIPT ANSWERS  Relationship to Patient?  Self

## 2023-08-08 NOTE — TELEPHONE ENCOUNTER
Medication:   Requested Prescriptions     Pending Prescriptions Disp Refills    clonazePAM (KLONOPIN) 1 MG tablet 30 tablet 0        Last Filled:  7/11/2023    Patient Phone Number: [phone] (home)     Last appt: 5/15/2023   Next appt: Visit date not found    Last OARRS:   RX Monitoring 7/18/2023   Attestation -   Periodic Controlled Substance Monitoring No signs of potential drug abuse or diversion identified.

## 2023-08-21 DIAGNOSIS — F98.8 ATTENTION DEFICIT DISORDER, UNSPECIFIED HYPERACTIVITY PRESENCE: ICD-10-CM

## 2023-08-21 NOTE — TELEPHONE ENCOUNTER
----- Message from Hosea Alexia sent at 8/18/2023  3:16 PM EDT -----  Subject: Refill Request    QUESTIONS  Name of Medication? lisdexamfetamine (VYVANSE) 30 MG capsule  Patient-reported dosage and instructions? once daily  How many days do you have left? 2  Preferred Pharmacy? [facility] [id number]  Pharmacy phone number (if available)? [phone]  ---------------------------------------------------------------------------  --------------  [place] INFO  What is the best way for the office to contact you? OK to leave message on   voicemail  Preferred Call Back Phone Number? [phone]  ---------------------------------------------------------------------------  --------------  SCRIPT ANSWERS  Relationship to Patient?  Self

## 2023-08-21 NOTE — TELEPHONE ENCOUNTER
Medication and Quantity requested:      lisdexamfetamine (VYVANSE) 30 MG capsule [[id number]]  ENDED        Last Visit    05/15/2023    Pharmacy and phone number updated in [facility]:  yes      Germaine angulo

## 2023-08-21 NOTE — TELEPHONE ENCOUNTER
Medication:   Requested Prescriptions     Pending Prescriptions Disp Refills    lisdexamfetamine (VYVANSE) 30 MG capsule 30 capsule 0     Sig: Take 1 capsule by mouth every morning for 30 days. Last appt: 5/15/2023   Next appt: Visit date not found    Last OARRS:   RX Monitoring 8/8/2023   Attestation -   Periodic Controlled Substance Monitoring No signs of potential drug abuse or diversion identified.

## 2023-09-05 DIAGNOSIS — F32.A ANXIETY AND DEPRESSION: ICD-10-CM

## 2023-09-05 DIAGNOSIS — F41.9 ANXIETY AND DEPRESSION: ICD-10-CM

## 2023-09-06 RX ORDER — CLONAZEPAM 1 MG/1
TABLET ORAL
Qty: 30 TABLET | Refills: 0 | Status: SHIPPED | OUTPATIENT
Start: 2023-09-06 | End: 2023-10-10 | Stop reason: SDUPTHER

## 2023-09-06 NOTE — TELEPHONE ENCOUNTER
Lov 5/15/23  Lrf 30 0 8/8/23 Medication:   Requested Prescriptions     Pending Prescriptions Disp Refills    clonazePAM (KLONOPIN) 1 MG tablet [Pharmacy Med Name: CLONAZEPAM 1 MG TABLET] 30 tablet      Sig: TAKE ONE TABLET BY MOUTH ONCE NIGHTLY AS NEEDED MAX OF 1 PER DAY        Last Filled:      Patient Phone Number: [phone] (home)     Last appt: 5/15/2023   Next appt: Visit date not found    Last OARRS:   RX Monitoring 8/21/2023   Attestation -   Periodic Controlled Substance Monitoring No signs of potential drug abuse or diversion identified.

## 2023-09-07 DIAGNOSIS — F32.A ANXIETY AND DEPRESSION: ICD-10-CM

## 2023-09-07 DIAGNOSIS — F41.9 ANXIETY AND DEPRESSION: ICD-10-CM

## 2023-09-07 RX ORDER — CLONAZEPAM 1 MG/1
TABLET ORAL
Qty: 30 TABLET | Refills: 0 | OUTPATIENT
Start: 2023-09-07 | End: 2023-10-07

## 2023-09-19 DIAGNOSIS — F98.8 ATTENTION DEFICIT DISORDER, UNSPECIFIED HYPERACTIVITY PRESENCE: ICD-10-CM

## 2023-09-19 RX ORDER — LISDEXAMFETAMINE DIMESYLATE CAPSULES 30 MG/1
30 CAPSULE ORAL EVERY MORNING
Qty: 30 CAPSULE | Refills: 0 | Status: SHIPPED | OUTPATIENT
Start: 2023-09-19 | End: 2023-10-19

## 2023-09-19 NOTE — TELEPHONE ENCOUNTER
Lov 5/15/23  Lrf 30 0 8/21/23 Medication:   Requested Prescriptions     Pending Prescriptions Disp Refills    lisdexamfetamine (VYVANSE) 30 MG capsule 30 capsule 0     Sig: Take 1 capsule by mouth every morning for 30 days. Last Filled:      Patient Phone Number: [phone] (home)     Last appt: 5/15/2023   Next appt: Visit date not found    Last OARRS:       8/21/2023    12:33 PM   RX Monitoring   Periodic Controlled Substance Monitoring No signs of potential drug abuse or diversion identified.

## 2023-09-19 NOTE — TELEPHONE ENCOUNTER
Medication and Quantity requested:      lisdexamfetamine (VYVANSE) 30 MG capsule [[id number]      Last Visit    05/15/2023    Pharmacy and phone number updated in [facility]:  yes  [facility]       Patient said this is the second call for this medication and needs to have it filled for she is leaving out of town

## 2023-10-10 DIAGNOSIS — F32.A ANXIETY AND DEPRESSION: ICD-10-CM

## 2023-10-10 DIAGNOSIS — F41.9 ANXIETY AND DEPRESSION: ICD-10-CM

## 2023-10-10 RX ORDER — CLONAZEPAM 1 MG/1
TABLET ORAL
Qty: 30 TABLET | Refills: 0 | Status: SHIPPED | OUTPATIENT
Start: 2023-10-10 | End: 2023-11-09

## 2023-10-10 NOTE — TELEPHONE ENCOUNTER
Called patient she is scheduled and requesting a refill until her appt  Medication:   Requested Prescriptions     Pending Prescriptions Disp Refills    clonazePAM (KLONOPIN) 1 MG tablet 30 tablet 0     Sig: TAKE ONE TABLET BY MOUTH ONCE NIGHTLY AS NEEDED MAX OF 1 PER DAY        Last Filled:  09/06/2023    Patient Phone Number: [phone] (home)     Last appt: 5/15/2023   Next appt: Visit date not found    Last OARRS:       10/10/2023     2:03 PM   RX Monitoring   Periodic Controlled Substance Monitoring No signs of potential drug abuse or diversion identified.

## 2023-10-10 NOTE — TELEPHONE ENCOUNTER
Medication:   Requested Prescriptions     Pending Prescriptions Disp Refills    clonazePAM (KLONOPIN) 1 MG tablet 30 tablet 0     Sig: TAKE ONE TABLET BY MOUTH ONCE NIGHTLY AS NEEDED MAX OF 1 PER DAY        Last Filled:  30 x 0 rf     Patient Phone Number: [phone] (home)     Last appt: 5/15/2023   Next appt: Visit date not found    Last OARRS:       8/21/2023    12:33 PM   RX Monitoring   Periodic Controlled Substance Monitoring No signs of potential drug abuse or diversion identified.

## 2023-10-10 NOTE — TELEPHONE ENCOUNTER
Medication and Quantity requested: clonazePAM (KLONOPIN) 1 MG tablet [[id number]       Last Visit  5/15/23    Pharmacy and phone number updated in EPIC:  yes

## 2023-10-19 DIAGNOSIS — F98.8 ATTENTION DEFICIT DISORDER, UNSPECIFIED HYPERACTIVITY PRESENCE: ICD-10-CM

## 2023-10-19 RX ORDER — LISDEXAMFETAMINE DIMESYLATE CAPSULES 30 MG/1
30 CAPSULE ORAL EVERY MORNING
Qty: 30 CAPSULE | Refills: 0 | Status: SHIPPED | OUTPATIENT
Start: 2023-10-19 | End: 2023-11-18

## 2023-10-19 NOTE — TELEPHONE ENCOUNTER
Medication and Quantity requested:   lisdexamfetamine (VYVANSE) 30 MG capsule [[id number]]           Last Visit    05/15/2023    Pharmacy and phone number updated in EPIC:  yes    Abimbola Brown       Patient is scheduled for 10/20/2023  She missed her last appt due to job interview she said

## 2023-10-19 NOTE — TELEPHONE ENCOUNTER
Medication:   Requested Prescriptions     Pending Prescriptions Disp Refills    lisdexamfetamine (VYVANSE) 30 MG capsule 30 capsule 0     Sig: Take 1 capsule by mouth every morning for 30 days. Last Filled:  30 x 0 RF 9/19/23    Patient Phone Number: [phone] (home)     Last appt: 5/15/2023   Next appt: 10/20/2023    Last OARRS:       10/10/2023     2:03 PM   RX Monitoring   Periodic Controlled Substance Monitoring No signs of potential drug abuse or diversion identified.

## 2023-11-09 DIAGNOSIS — F32.A ANXIETY AND DEPRESSION: ICD-10-CM

## 2023-11-09 DIAGNOSIS — F41.9 ANXIETY AND DEPRESSION: ICD-10-CM

## 2023-11-09 RX ORDER — CLONAZEPAM 1 MG/1
TABLET ORAL
Qty: 30 TABLET | Refills: 0 | Status: SHIPPED | OUTPATIENT
Start: 2023-11-09 | End: 2023-12-11

## 2023-11-09 NOTE — TELEPHONE ENCOUNTER
Medication:   Requested Prescriptions     Pending Prescriptions Disp Refills    clonazePAM (KLONOPIN) 1 MG tablet [Pharmacy Med Name: CLONAZEPAM 1 MG TABLET] 30 tablet      Sig: TAKE ONE TABLET BY MOUTH ONCE NIGHTLY AS NEEDED        Last Filled:  10/10/2023      Patient Phone Number: [phone] (home)     Last appt: 5/15/2023   Next appt: Visit date not found    Last OARRS:       10/10/2023     2:03 PM   RX Monitoring   Periodic Controlled Substance Monitoring No signs of potential drug abuse or diversion identified.

## 2023-11-09 NOTE — TELEPHONE ENCOUNTER
She has missed her last couple appointments.  She is past the 6-month johnna and see me.  Last refill without an appointment

## 2023-11-20 ENCOUNTER — SCHEDULED TELEPHONE ENCOUNTER (OUTPATIENT)
Dept: FAMILY MEDICINE CLINIC | Age: 58
End: 2023-11-20
Payer: COMMERCIAL

## 2023-11-20 ENCOUNTER — TELEPHONE (OUTPATIENT)
Dept: FAMILY MEDICINE CLINIC | Age: 58
End: 2023-11-20

## 2023-11-20 DIAGNOSIS — F32.A ANXIETY AND DEPRESSION: Primary | ICD-10-CM

## 2023-11-20 DIAGNOSIS — F43.23 ADJUSTMENT DISORDER WITH MIXED ANXIETY AND DEPRESSED MOOD: ICD-10-CM

## 2023-11-20 DIAGNOSIS — F41.9 ANXIETY AND DEPRESSION: Primary | ICD-10-CM

## 2023-11-20 DIAGNOSIS — F98.8 ATTENTION DEFICIT DISORDER, UNSPECIFIED HYPERACTIVITY PRESENCE: ICD-10-CM

## 2023-11-20 PROCEDURE — 99442 PR PHYS/QHP TELEPHONE EVALUATION 11-20 MIN: CPT | Performed by: FAMILY MEDICINE

## 2023-11-20 RX ORDER — LISDEXAMFETAMINE DIMESYLATE CAPSULES 30 MG/1
30 CAPSULE ORAL EVERY MORNING
Qty: 30 CAPSULE | Refills: 0 | Status: SHIPPED | OUTPATIENT
Start: 2023-11-20 | End: 2023-12-20

## 2023-11-20 NOTE — PROGRESS NOTES
Documentation:  I communicated with the patient and/or health care decision maker about ADD/anxiety/stress depression. Details of this discussion including any medical advice provided: See below    Patient states her son has significant maxillofacial pain but is unable to get pain pills from doctors. She states he therefore takes street drugs and apparently had a fentanyl overdose. She states he actually  but was brought back to life. She states she has been significantly stressed regarding this. Additional stresses that she is no longer working at [employer] and works part-time for an  who is about to go out of business. She states because of her work however she continues to need her medication for ADD. Her anxiety and stress level remains high as noted above and she continues to need the clonazepam.  She states she is due for refill of her Vyvanse. Total Time: minutes: 11-20 minutes    Norberto Broussard was evaluated through a synchronous (real-time) audio encounter. Patient identification was verified at the start of the visit. She (or guardian if applicable) is aware that this is a billable service, which includes applicable co-pays. This visit was conducted with the patient's (and/or legal guardian's) verbal consent. She has not had a related appointment within my department in the past 7 days or scheduled within the next 24 hours. The patient was located at Home: [address] [place] [address]. The provider was located at [facility] (Appt Dept): [address]. Note: not billable if this call serves to triage the patient into an appointment for the relevant concern    Santo Rizvi is a 62 y.o. female evaluated via telephone on 2023 for Medication Check  .         Keshawn Tovar MD

## 2023-11-20 NOTE — TELEPHONE ENCOUNTER
Patient has an [phone] appt today and can't make it due to no gas in her car    She would like to have her appt changed to a VV today instead if Dr Rudolph Zaman will do a VV     Please call patient

## 2023-12-09 DIAGNOSIS — F41.9 ANXIETY AND DEPRESSION: ICD-10-CM

## 2023-12-09 DIAGNOSIS — F32.A ANXIETY AND DEPRESSION: ICD-10-CM

## 2023-12-11 RX ORDER — CLONAZEPAM 1 MG/1
TABLET ORAL
Qty: 30 TABLET | Refills: 0 | Status: SHIPPED | OUTPATIENT
Start: 2023-12-11 | End: 2024-01-10

## 2023-12-11 NOTE — TELEPHONE ENCOUNTER
Medication:   Requested Prescriptions     Pending Prescriptions Disp Refills    clonazePAM (KLONOPIN) 1 MG tablet [Pharmacy Med Name: clonazePAM 1 MG TABLET] 30 tablet      Sig: TAKE ONE TABLET BY MOUTH  ONCE NIGHTLY AS NEEDED        Last Filled:  11/9/23    Patient Phone Number: [phone] (home)     Last appt: 11/20/2023   Next appt: Visit date not found    Last OARRS:       11/20/2023    11:53 AM   RX Monitoring   Periodic Controlled Substance Monitoring No signs of potential drug abuse or diversion identified.

## 2023-12-15 DIAGNOSIS — F98.8 ATTENTION DEFICIT DISORDER, UNSPECIFIED HYPERACTIVITY PRESENCE: ICD-10-CM

## 2023-12-15 RX ORDER — LISDEXAMFETAMINE DIMESYLATE CAPSULES 30 MG/1
30 CAPSULE ORAL EVERY MORNING
Qty: 30 CAPSULE | Refills: 0 | Status: SHIPPED | OUTPATIENT
Start: 2023-12-15 | End: 2024-01-14

## 2023-12-15 NOTE — TELEPHONE ENCOUNTER
Medication:   Requested Prescriptions     Pending Prescriptions Disp Refills    lisdexamfetamine (VYVANSE) 30 MG capsule 30 capsule 0     Sig: Take 1 capsule by mouth every morning for 30 days. Last Filled:  11/20/23  Patient Phone Number: [phone] (home)     Last appt: 11/20/2023   Next appt: Visit date not found    Last OARRS:       11/20/2023    11:53 AM   RX Monitoring   Periodic Controlled Substance Monitoring No signs of potential drug abuse or diversion identified.

## 2023-12-15 NOTE — TELEPHONE ENCOUNTER
Medication and Quantity requested:      lisdexamfetamine (VYVANSE) 30 MG capsule [[id number]]       Last Visit  05/15/2023      Pharmacy and phone number updated in EPIC:  yes    Mirian Nunes

## 2023-12-20 ENCOUNTER — TELEPHONE (OUTPATIENT)
Dept: ADMINISTRATIVE | Age: 58
End: 2023-12-20

## 2023-12-20 DIAGNOSIS — F98.8 ATTENTION DEFICIT DISORDER (ADD) WITHOUT HYPERACTIVITY: Primary | ICD-10-CM

## 2023-12-20 NOTE — TELEPHONE ENCOUNTER
Submitted PA for Lisdexamfetamine Dimesylate 30MG capsules   Via CM Key: [id number] STATUS: PENDING.    Follow up done daily; if no decision with in three days we will refax.  If another three days goes by with no decision will call the insurance for status.

## 2023-12-27 NOTE — TELEPHONE ENCOUNTER
The medication was DENIED. - Phone call to Carla Stanton on the automated system told me that it was denied. Unable to speak to someone yet because they are still closed.     If this requires a response please respond to the pool ( P [facility] MEDICATION PRE-AUTH).      Thank you please advise patient.

## 2023-12-28 NOTE — TELEPHONE ENCOUNTER
Patient needs to have tried and failed 3 medications she has only tried Vyvanse she would have to try 2 more concerta and quill chew ER  Call Reference #[id number]

## 2023-12-28 NOTE — TELEPHONE ENCOUNTER
Let patient know she needs to try different medications as they told you.  If she is amenable to this if Concerta is one of them, we can prescribe that.  I am not certain what Jak bee is

## 2024-01-03 RX ORDER — METHYLPHENIDATE HYDROCHLORIDE 18 MG/1
18 TABLET ORAL DAILY
Qty: 30 TABLET | Refills: 0 | Status: CANCELLED | OUTPATIENT
Start: 2024-01-03 | End: 2024-02-02

## 2024-01-03 NOTE — TELEPHONE ENCOUNTER
lisdexamfetamine (VYVANSE) 30 MG capsule     Patient said she is okay with taking the concerta as an alternative and that can be called in to the pharmacy

## 2024-01-03 NOTE — TELEPHONE ENCOUNTER
Concerta will not electronically transfer to the [facility] pharmacy listed.  So I can electronically send it she will have to pick another pharmacy.  Adarsh up Concerta 18 mg #30 with the selected pharmacy and send back to me.

## 2024-01-03 NOTE — TELEPHONE ENCOUNTER
[facility] is still listed as the pharmacy and it will not transmit electronically to that store as I stated in the previous message.  She will need to select a different pharmacy.

## 2024-01-05 ENCOUNTER — TELEPHONE (OUTPATIENT)
Dept: FAMILY MEDICINE CLINIC | Age: 59
End: 2024-01-05

## 2024-01-05 DIAGNOSIS — F98.8 ATTENTION DEFICIT DISORDER, UNSPECIFIED HYPERACTIVITY PRESENCE: Primary | ICD-10-CM

## 2024-01-05 RX ORDER — METHYLPHENIDATE HYDROCHLORIDE 18 MG/1
18 TABLET ORAL DAILY
Qty: 30 TABLET | Refills: 0 | Status: SHIPPED | OUTPATIENT
Start: 2024-01-05 | End: 2024-02-04

## 2024-01-05 NOTE — TELEPHONE ENCOUNTER
Medication and Quantity requested:   clonazePAM (KLONOPIN) 1 MG tablet       Last Visit  11/20/23    Pharmacy and phone number updated in EPIC:  yes- [facility]

## 2024-01-05 NOTE — TELEPHONE ENCOUNTER
The prescription subsequently transmitted.  Leave her a message or call her that Concerta is now at [facility], then okay to close encounter.

## 2024-01-08 ENCOUNTER — TELEPHONE (OUTPATIENT)
Dept: FAMILY MEDICINE CLINIC | Age: 59
End: 2024-01-08

## 2024-01-08 NOTE — TELEPHONE ENCOUNTER
Here is the group I use who are taking new patients    [facility]  [address] [facility] [address]  [phone]

## 2024-01-08 NOTE — TELEPHONE ENCOUNTER
----- Message from Rahmitch Beverly sent at 1/8/2024  1:11 PM EST -----  Subject: Referral Request    Reason for referral request? Patient requesting to talk with Damon Hunter   regarding getting a referral for a skin specialist reason she has couple   of moles on her back and also need one side of her nose to be looked at   the size is changes   Provider patient wants to be referred to(if known):     Provider Phone Number(if known):    Additional Information for Provider?   ---------------------------------------------------------------------------  --------------  CALL BACK INFO    [phone]; OK to leave message on voicemail  ---------------------------------------------------------------------------  --------------

## 2024-01-09 ENCOUNTER — TELEPHONE (OUTPATIENT)
Dept: FAMILY MEDICINE CLINIC | Age: 59
End: 2024-01-09

## 2024-01-09 DIAGNOSIS — F32.A ANXIETY AND DEPRESSION: ICD-10-CM

## 2024-01-09 DIAGNOSIS — F41.9 ANXIETY AND DEPRESSION: ICD-10-CM

## 2024-01-09 RX ORDER — CLONAZEPAM 1 MG/1
TABLET ORAL
Qty: 30 TABLET | Refills: 0 | Status: SHIPPED | OUTPATIENT
Start: 2024-01-09 | End: 2024-02-09

## 2024-01-09 NOTE — TELEPHONE ENCOUNTER
Lov 11/20/23  Lrf 30 0 12/11/23 Medication:   Requested Prescriptions     Pending Prescriptions Disp Refills    clonazePAM (KLONOPIN) 1 MG tablet [Pharmacy Med Name: clonazePAM 1 MG TABLET] 30 tablet      Sig: TAKE ONE TABLET BY MOUTH ONCE NIGHTLY AS NEEDED        Last Filled:      Patient Phone Number: [phone] (home)     Last appt: 11/20/2023   Next appt: Visit date not found    Last OARRS:       11/20/2023    11:53 AM   RX Monitoring   Periodic Controlled Substance Monitoring No signs of potential drug abuse or diversion identified.

## 2024-01-10 NOTE — TELEPHONE ENCOUNTER
clonazePAM (KLONOPIN) 1 MG tablet     Patient said the above medication does not work for the her. Patient said she would prefer the adderall 30 mg called in instead

## 2024-01-11 ENCOUNTER — TELEPHONE (OUTPATIENT)
Dept: FAMILY MEDICINE CLINIC | Age: 59
End: 2024-01-11

## 2024-01-11 NOTE — TELEPHONE ENCOUNTER
She can double what she has and take 36 mg and if that does not work when she is done with it we will switch to Adderall but since she just picked that up recently she will have to finish that prescription.

## 2024-01-11 NOTE — TELEPHONE ENCOUNTER
----- Message from Yusuf Sánchez sent at 1/11/2024  1:16 PM EST -----  Subject: Medication Problem     Medication: methylphenidate (CONCERTA) 18 MG extended release tablet  Dosage: 18mg 1X day  Ordering Provider:     Question/Problem: Pt states that this medication isn't strong enough and   isn't working for her. She said it just made her go to sleep to where she   didn't want to get up and that's not it.       Pharmacy: [facility] [id number] - [facility] [address] [facility] [address] - P [phone] - F [phone]    ---------------------------------------------------------------------------  --------------  CALL BACK INFO  [phone]; OK to leave message on voicemail  ---------------------------------------------------------------------------  --------------    SCRIPT ANSWERS  Relationship to Patient: Self

## 2024-02-02 DIAGNOSIS — F32.A ANXIETY AND DEPRESSION: ICD-10-CM

## 2024-02-02 DIAGNOSIS — F41.9 ANXIETY AND DEPRESSION: ICD-10-CM

## 2024-02-02 RX ORDER — SERTRALINE HYDROCHLORIDE 25 MG/1
25 TABLET, FILM COATED ORAL DAILY
Qty: 90 TABLET | Refills: 0 | Status: SHIPPED | OUTPATIENT
Start: 2024-02-02

## 2024-02-02 RX ORDER — CLONAZEPAM 1 MG/1
TABLET ORAL
Qty: 30 TABLET | Refills: 0 | Status: SHIPPED | OUTPATIENT
Start: 2024-02-02 | End: 2024-03-04

## 2024-02-02 NOTE — TELEPHONE ENCOUNTER
Medication and Quantity requested:   clonazePAM (KLONOPIN) 1 MG tablet      sertraline (ZOLOFT) 50 MG tablet   Last Visit  5/15/23    Pharmacy and phone number updated in EPIC:  yes- [facility]

## 2024-02-06 DIAGNOSIS — F98.8 ATTENTION DEFICIT DISORDER, UNSPECIFIED HYPERACTIVITY PRESENCE: ICD-10-CM

## 2024-02-06 RX ORDER — METHYLPHENIDATE HYDROCHLORIDE 18 MG/1
18 TABLET ORAL DAILY
Qty: 30 TABLET | Refills: 0 | Status: SHIPPED | OUTPATIENT
Start: 2024-02-06 | End: 2024-03-07

## 2024-02-06 NOTE — TELEPHONE ENCOUNTER
Medication:   Requested Prescriptions     Pending Prescriptions Disp Refills    methylphenidate (CONCERTA) 18 MG extended release tablet 30 tablet 0     Sig: Take 1 tablet by mouth daily for 30 days. Max Daily Amount: 18 mg        Last Filled:  1/5/2024, 30, 0    Patient Phone Number: [phone] (home)     Last appt: 11/20/2023   Next appt: Visit date not found    Last OARRS:       11/20/2023    11:53 AM   RX Monitoring   Periodic Controlled Substance Monitoring No signs of potential drug abuse or diversion identified.

## 2024-02-06 NOTE — TELEPHONE ENCOUNTER
Medication and Quantity requested:    methylphenidate (CONCERTA) 18 MG extended release tablet [[id number]]  ENDED     Last Visit  5/15/23      Pharmacy and phone number updated in EPIC:  yes  [facility] [address]

## 2024-02-09 DIAGNOSIS — N89.8 VAGINAL ODOR: ICD-10-CM

## 2024-02-09 DIAGNOSIS — F41.9 ANXIETY AND DEPRESSION: ICD-10-CM

## 2024-02-09 DIAGNOSIS — F98.8 ATTENTION DEFICIT DISORDER, UNSPECIFIED HYPERACTIVITY PRESENCE: ICD-10-CM

## 2024-02-09 DIAGNOSIS — F32.A ANXIETY AND DEPRESSION: ICD-10-CM

## 2024-02-09 RX ORDER — METHYLPHENIDATE HYDROCHLORIDE 18 MG/1
18 TABLET ORAL DAILY
Qty: 30 TABLET | Refills: 0 | OUTPATIENT
Start: 2024-02-09 | End: 2024-03-10

## 2024-02-09 RX ORDER — METRONIDAZOLE 7.5 MG/G
GEL VAGINAL
Qty: 70 G | Refills: 0 | Status: SHIPPED | OUTPATIENT
Start: 2024-02-09 | End: 2024-03-12 | Stop reason: SDUPTHER

## 2024-02-09 RX ORDER — CLONAZEPAM 1 MG/1
TABLET ORAL
Qty: 30 TABLET | Refills: 0 | OUTPATIENT
Start: 2024-02-09 | End: 2024-03-11

## 2024-02-09 NOTE — TELEPHONE ENCOUNTER
----- Message from Taylor Jordan sent at 2/8/2024  3:42 PM EST -----  Subject: Refill Request    QUESTIONS  Name of Medication? clonazePAM (KLONOPIN) 1 MG tablet  Patient-reported dosage and instructions? once daily  How many days do you have left? 3  Preferred Pharmacy? [facility] [id number]  Pharmacy phone number (if available)? [phone]  ---------------------------------------------------------------------------  --------------,  Name of Medication? Other - Rittalin  Patient-reported dosage and instructions? once daily  How many days do you have left? 5  Preferred Pharmacy? [facility] [id number]  Pharmacy phone number (if available)? [phone]  ---------------------------------------------------------------------------  --------------,  Name of Medication? metroNIDAZOLE (METROGEL) 0.75 % vaginal gel  Patient-reported dosage and instructions? as needed  How many days do you have left? 0  [id number]  Pharmacy phone number (if available)? [phone]  ---------------------------------------------------------------------------  --------------  CALL BACK INFO  What is the best way for the office to contact you? OK to leave message on   voicemail  Preferred Call Back Phone Number? [phone]  ---------------------------------------------------------------------------  --------------  SCRIPT ANSWERS  Relationship to Patient? Self

## 2024-03-12 DIAGNOSIS — N89.8 VAGINAL ODOR: ICD-10-CM

## 2024-03-12 DIAGNOSIS — F41.9 ANXIETY AND DEPRESSION: ICD-10-CM

## 2024-03-12 DIAGNOSIS — F32.A ANXIETY AND DEPRESSION: ICD-10-CM

## 2024-03-12 DIAGNOSIS — F98.8 ATTENTION DEFICIT DISORDER, UNSPECIFIED HYPERACTIVITY PRESENCE: ICD-10-CM

## 2024-03-12 RX ORDER — METRONIDAZOLE 7.5 MG/G
GEL VAGINAL
Qty: 70 G | Refills: 0 | Status: SHIPPED | OUTPATIENT
Start: 2024-03-12

## 2024-03-12 RX ORDER — METHYLPHENIDATE HYDROCHLORIDE 18 MG/1
18 TABLET ORAL DAILY
Qty: 30 TABLET | Refills: 0 | Status: SHIPPED | OUTPATIENT
Start: 2024-03-12 | End: 2024-04-11

## 2024-03-12 RX ORDER — CLONAZEPAM 1 MG/1
TABLET ORAL
Qty: 30 TABLET | Refills: 0 | Status: SHIPPED | OUTPATIENT
Start: 2024-03-12 | End: 2024-04-12

## 2024-03-12 NOTE — TELEPHONE ENCOUNTER
Medication and Quantity requested:      metroNIDAZOLE (METROGEL) 0.75 % vaginal gel [[id number]]     AND    methylphenidate (CONCERTA) 18 MG extended release tablet [[id number]]  ENDED     AND    clonazePAM (KLONOPIN) 1 MG tablet [[id number]]  ENDED       Last Visit    5-    Pharmacy and phone number updated in [facility]:  yes      [facility] [id number] - [address] - P [phone] - F [phone]

## 2024-03-21 DIAGNOSIS — F32.A ANXIETY AND DEPRESSION: ICD-10-CM

## 2024-03-21 DIAGNOSIS — F41.9 ANXIETY AND DEPRESSION: ICD-10-CM

## 2024-03-21 RX ORDER — CLONAZEPAM 1 MG/1
TABLET ORAL
Qty: 30 TABLET | Refills: 0 | OUTPATIENT
Start: 2024-03-21 | End: 2024-04-21

## 2024-03-21 NOTE — TELEPHONE ENCOUNTER
Medication:   Requested Prescriptions     Pending Prescriptions Disp Refills    clonazePAM (KLONOPIN) 1 MG tablet 30 tablet 0     Si tab hs        Last Filled:  3/12/24    Patient Phone Number: [phone] (home)     Last appt: 2023   Next appt: Visit date not found    Last OARRS:       2023    11:53 AM   RX Monitoring   Periodic Controlled Substance Monitoring No signs of potential drug abuse or diversion identified.

## 2024-03-21 NOTE — TELEPHONE ENCOUNTER
Medication and Quantity requested:      clonazePAM (KLONOPIN) 1 MG tablet     Last Visit  11/20/23 - Dr Hunter    Pharmacy and phone number updated in EPIC:  yes    [facility] [address]

## 2024-03-22 ENCOUNTER — TELEMEDICINE (OUTPATIENT)
Dept: FAMILY MEDICINE CLINIC | Age: 59
End: 2024-03-22

## 2024-03-22 DIAGNOSIS — F32.A ANXIETY AND DEPRESSION: ICD-10-CM

## 2024-03-22 DIAGNOSIS — F41.9 ANXIETY AND DEPRESSION: ICD-10-CM

## 2024-03-22 DIAGNOSIS — F98.8 ATTENTION DEFICIT DISORDER, UNSPECIFIED HYPERACTIVITY PRESENCE: Primary | ICD-10-CM

## 2024-03-22 PROCEDURE — 99213 OFFICE O/P EST LOW 20 MIN: CPT | Performed by: FAMILY MEDICINE

## 2024-03-22 PROCEDURE — 3017F COLORECTAL CA SCREEN DOC REV: CPT | Performed by: FAMILY MEDICINE

## 2024-03-22 PROCEDURE — G8427 DOCREV CUR MEDS BY ELIG CLIN: HCPCS | Performed by: FAMILY MEDICINE

## 2024-03-22 RX ORDER — CLONAZEPAM 1 MG/1
TABLET ORAL
Qty: 30 TABLET | Refills: 0 | Status: CANCELLED | OUTPATIENT
Start: 2024-03-22 | End: 2024-04-22

## 2024-03-22 ASSESSMENT — PATIENT HEALTH QUESTIONNAIRE - PHQ9
10. IF YOU CHECKED OFF ANY PROBLEMS, HOW DIFFICULT HAVE THESE PROBLEMS MADE IT FOR YOU TO DO YOUR WORK, TAKE CARE OF THINGS AT HOME, OR GET ALONG WITH OTHER PEOPLE: NOT DIFFICULT AT ALL
7. TROUBLE CONCENTRATING ON THINGS, SUCH AS READING THE NEWSPAPER OR WATCHING TELEVISION: NOT AT ALL
1. LITTLE INTEREST OR PLEASURE IN DOING THINGS: NOT AT ALL
9. THOUGHTS THAT YOU WOULD BE BETTER OFF DEAD, OR OF HURTING YOURSELF: NOT AT ALL
5. POOR APPETITE OR OVEREATING: NOT AT ALL
3. TROUBLE FALLING OR STAYING ASLEEP: NOT AT ALL
4. FEELING TIRED OR HAVING LITTLE ENERGY: NOT AT ALL
SUM OF ALL RESPONSES TO PHQ QUESTIONS 1-9: 0
2. FEELING DOWN, DEPRESSED OR HOPELESS: NOT AT ALL
SUM OF ALL RESPONSES TO PHQ QUESTIONS 1-9: 0
8. MOVING OR SPEAKING SO SLOWLY THAT OTHER PEOPLE COULD HAVE NOTICED. OR THE OPPOSITE, BEING SO FIGETY OR RESTLESS THAT YOU HAVE BEEN MOVING AROUND A LOT MORE THAN USUAL: NOT AT ALL
SUM OF ALL RESPONSES TO PHQ QUESTIONS 1-9: 0
SUM OF ALL RESPONSES TO PHQ9 QUESTIONS 1 & 2: 0
6. FEELING BAD ABOUT YOURSELF - OR THAT YOU ARE A FAILURE OR HAVE LET YOURSELF OR YOUR FAMILY DOWN: NOT AT ALL
SUM OF ALL RESPONSES TO PHQ QUESTIONS 1-9: 0

## 2024-03-22 ASSESSMENT — ENCOUNTER SYMPTOMS
RESPIRATORY NEGATIVE: 1
GASTROINTESTINAL NEGATIVE: 1

## 2024-03-22 NOTE — PROGRESS NOTES
visualized signs of difficulty breathing or respiratory distress        [] Abnormal-      Musculoskeletal:   [] Normal gait with no signs of ataxia         [x] Normal range of motion of neck        [] Abnormal-       Neurological:        [x] No Facial Asymmetry (Cranial nerve 7 motor function) (limited exam to video visit)          [x] No gaze palsy        [] Abnormal-         Skin:        [x] No significant exanthematous lesions or discoloration noted on facial skin         [] Abnormal-            Psychiatric:       [x] Normal Affect [x] No Hallucinations        [] Abnormal-     Other pertinent observable physical exam findings-     ASSESSMENT/PLAN:  1. Anxiety and depression  OARRS report reviewed and no inconsistencies noted   The patient understands the risks of dependency/addiction with clonazepam and will take as little as possible and discontinue as soon as possible     2. Attention deficit disorder, unspecified hyperactivity presence  OARRS report reviewed and no inconsistencies noted   The patient understands the risks of dependency/addiction with adderall and will take as little as possible and discontinue as soon as possible       Return in about 3 months (around 6/22/2024) for OARRS.    Cyndi Broussard is a 58 y.o. female being evaluated by a Virtual Visit (video visit) encounter to address concerns as mentioned above.  A caregiver was present when appropriate. Due to this being a TeleHealth encounter (During COVID-19 public health emergency), evaluation of the following organ systems was limited: Vitals/Constitutional/EENT/Resp/CV/GI//MS/Neuro/Skin/Heme-Lymph-Imm.  Pursuant to the emergency declaration under the [place] Act and the National Emergencies Act, 1135 waiver authority and the Coronavirus Preparedness and Response Supplemental Appropriations Act, this Virtual Visit was conducted with patient's (and/or legal guardian's) consent, to reduce the patient's risk of exposure to COVID-19 and provide

## 2024-04-25 DIAGNOSIS — F98.8 ATTENTION DEFICIT DISORDER, UNSPECIFIED HYPERACTIVITY PRESENCE: ICD-10-CM

## 2024-04-25 NOTE — TELEPHONE ENCOUNTER
Medication and Quantity requested: Methylphenidate 18mg     Last Visit  3-22-24,Dr. Hunter    Pharmacy and phone number updated in EPIC:  yes,[facility]

## 2024-04-26 RX ORDER — METHYLPHENIDATE HYDROCHLORIDE 18 MG/1
18 TABLET ORAL DAILY
Qty: 30 TABLET | Refills: 0 | Status: SHIPPED | OUTPATIENT
Start: 2024-04-26 | End: 2024-05-26

## 2024-04-26 NOTE — TELEPHONE ENCOUNTER
Lov 3/22/24  Lrf 30 0 3/12/24 Medication:   Requested Prescriptions     Pending Prescriptions Disp Refills    methylphenidate (CONCERTA) 18 MG extended release tablet 30 tablet 0     Sig: Take 1 tablet by mouth daily for 30 days. Max Daily Amount: 18 mg        Last Filled:      Patient Phone Number: [phone] (home)     Last appt: 3/22/2024   Next appt: Visit date not found    Last OARRS:       3/22/2024     2:00 PM   RX Monitoring   Periodic Controlled Substance Monitoring No signs of potential drug abuse or diversion identified.

## 2024-05-10 DIAGNOSIS — F32.A ANXIETY AND DEPRESSION: ICD-10-CM

## 2024-05-10 DIAGNOSIS — F41.9 ANXIETY AND DEPRESSION: ICD-10-CM

## 2024-05-10 RX ORDER — CLONAZEPAM 1 MG/1
TABLET ORAL
Qty: 30 TABLET | Refills: 0 | Status: SHIPPED | OUTPATIENT
Start: 2024-05-10 | End: 2024-06-10

## 2024-05-10 NOTE — TELEPHONE ENCOUNTER
Medication:   Requested Prescriptions     Pending Prescriptions Disp Refills    clonazePAM (KLONOPIN) 1 MG tablet 30 tablet 0     Si tab hs        Last Filled:  3/12/24    Patient Phone Number: [phone] (home)     Last appt: 3/22/2024   Next appt: Visit date not found    Last OARRS:       3/22/2024     2:00 PM   RX Monitoring   Periodic Controlled Substance Monitoring No signs of potential drug abuse or diversion identified.

## 2024-05-10 NOTE — TELEPHONE ENCOUNTER
Medication and Quantity requested:   clonazePAM (KLONOPIN) 1 MG tablet       Last Visit  3/22/2024    Pharmacy and phone number updated in EPIC:  yes- [facility] to [facility] [address]

## 2024-05-30 DIAGNOSIS — F98.8 ATTENTION DEFICIT DISORDER, UNSPECIFIED HYPERACTIVITY PRESENCE: ICD-10-CM

## 2024-05-30 RX ORDER — METHYLPHENIDATE HYDROCHLORIDE 18 MG/1
18 TABLET ORAL DAILY
Qty: 30 TABLET | Refills: 0 | Status: SHIPPED | OUTPATIENT
Start: 2024-05-30 | End: 2024-06-29

## 2024-05-30 NOTE — TELEPHONE ENCOUNTER
Two Rx requests    Medication and Quantity requested:      methylphenidate (CONCERTA) 18 MG extended release tablet [[id number]]  ENDED      AND    clonazePAM (KLONOPIN) 1 MG tablet [[id number]]       Last Visit    5-    Pharmacy and phone number updated in [facility]:  yes    [facility] [id number] - [facility] [address] - P [phone] - F [phone]

## 2024-05-30 NOTE — TELEPHONE ENCOUNTER
Medication:   Requested Prescriptions     Pending Prescriptions Disp Refills    methylphenidate (CONCERTA) 18 MG extended release tablet 30 tablet 0     Sig: Take 1 tablet by mouth daily for 30 days. Max Daily Amount: 18 mg        Last Filled:  04/26/2024, 30, 0    Patient Phone Number: [phone] (home)     Last appt: 3/22/2024   Next appt: Visit date not found    Last OARRS:       3/22/2024     2:00 PM   RX Monitoring   Periodic Controlled Substance Monitoring No signs of potential drug abuse or diversion identified.

## 2024-06-13 DIAGNOSIS — F32.A ANXIETY AND DEPRESSION: ICD-10-CM

## 2024-06-13 DIAGNOSIS — F41.9 ANXIETY AND DEPRESSION: ICD-10-CM

## 2024-06-13 RX ORDER — CLONAZEPAM 1 MG/1
TABLET ORAL
Qty: 30 TABLET | Refills: 0 | Status: SHIPPED | OUTPATIENT
Start: 2024-06-13 | End: 2024-07-14

## 2024-06-13 NOTE — TELEPHONE ENCOUNTER
Medication and Quantity requested:      clonazePAM (KLONOPIN) 1 MG tablet [[id number]]  ENDE         Last Visit  03/22/2024      Pharmacy and phone number updated in EPIC:  yes      [facility]

## 2024-06-13 NOTE — TELEPHONE ENCOUNTER
Medication:   Requested Prescriptions     Pending Prescriptions Disp Refills    clonazePAM (KLONOPIN) 1 MG tablet 30 tablet 0     Si tab hs        Last Filled:  05/10/2024, 30,0    Patient Phone Number: [phone] (home)     Last appt: 3/22/2024   Next appt: Visit date not found    Last OARRS:       3/22/2024     2:00 PM   RX Monitoring   Periodic Controlled Substance Monitoring No signs of potential drug abuse or diversion identified.

## 2024-07-08 ENCOUNTER — TELEPHONE (OUTPATIENT)
Dept: FAMILY MEDICINE CLINIC | Age: 59
End: 2024-07-08

## 2024-07-08 DIAGNOSIS — F98.8 ATTENTION DEFICIT DISORDER, UNSPECIFIED HYPERACTIVITY PRESENCE: ICD-10-CM

## 2024-07-08 RX ORDER — METHYLPHENIDATE HYDROCHLORIDE 18 MG/1
18 TABLET ORAL DAILY
Qty: 30 TABLET | Refills: 0 | Status: SHIPPED | OUTPATIENT
Start: 2024-07-08 | End: 2024-08-07

## 2024-07-08 NOTE — TELEPHONE ENCOUNTER
Patient states her car is not working and she can only do a VV at this time  Patient was informed she needed to be seen in office for refills on RX  Patient states she does need a refill of [id number]  Patient can get a ride to pharmacy to   If possible patient is requesting Concerta refill to [facility] and VV until able to schedule in office  Review and contact patient

## 2024-07-22 ENCOUNTER — TELEMEDICINE (OUTPATIENT)
Dept: FAMILY MEDICINE CLINIC | Age: 59
End: 2024-07-22

## 2024-07-22 DIAGNOSIS — F41.9 ANXIETY AND DEPRESSION: ICD-10-CM

## 2024-07-22 DIAGNOSIS — F32.A ANXIETY AND DEPRESSION: ICD-10-CM

## 2024-07-22 DIAGNOSIS — F98.8 ATTENTION DEFICIT DISORDER, UNSPECIFIED HYPERACTIVITY PRESENCE: Primary | ICD-10-CM

## 2024-10-14 DIAGNOSIS — F90.0 ATTENTION DEFICIT HYPERACTIVITY DISORDER (ADHD), PREDOMINANTLY INATTENTIVE TYPE: ICD-10-CM

## 2024-10-14 RX ORDER — LISDEXAMFETAMINE DIMESYLATE 30 MG/1
30 CAPSULE ORAL EVERY MORNING
Qty: 30 CAPSULE | Refills: 0 | Status: SHIPPED | OUTPATIENT
Start: 2024-10-14 | End: 2024-11-13

## 2024-10-14 NOTE — TELEPHONE ENCOUNTER
Lov 7/22/24  Led 30 0 12/15/23 Medication:   Requested Prescriptions     Pending Prescriptions Disp Refills    lisdexamfetamine (VYVANSE) 30 MG capsule 30 capsule 0     Sig: Take 1 capsule by mouth every morning for 30 days.        Last Filled:      Patient Phone Number: [phone] (home)     Last appt: 7/22/2024   Next appt: Visit date not found    Last OARRS:       7/22/2024     4:51 PM   RX Monitoring   Periodic Controlled Substance Monitoring No signs of potential drug abuse or diversion identified.

## 2024-10-14 NOTE — TELEPHONE ENCOUNTER
Medication and Quantity requested:      lisdexamfetamine (VYVANSE) 30 MG capsule [[id number]]  ENDED         Last Visit  07/22/2024    Pharmacy and phone number updated in [facility]:  [facility]